# Patient Record
Sex: MALE | Race: WHITE | NOT HISPANIC OR LATINO | Employment: OTHER | URBAN - METROPOLITAN AREA
[De-identification: names, ages, dates, MRNs, and addresses within clinical notes are randomized per-mention and may not be internally consistent; named-entity substitution may affect disease eponyms.]

---

## 2017-02-06 ENCOUNTER — HISTORICAL (OUTPATIENT)
Dept: ADMINISTRATIVE | Facility: HOSPITAL | Age: 62
End: 2017-02-06

## 2017-02-06 LAB
ALBUMIN SERPL-MCNC: 3.3 G/DL (ref 3.1–4.7)
ALP SERPL-CCNC: 82 IU/L (ref 40–104)
ALT (SGPT): 12 IU/L (ref 3–33)
AST SERPL-CCNC: 14 IU/L (ref 10–40)
BASOPHILS NFR BLD: 0.1 K/UL (ref 0–0.2)
BASOPHILS NFR BLD: 0.6 %
BILIRUB SERPL-MCNC: 0.8 MG/DL (ref 0.3–1)
BILIRUBIN DIRECT+TOT PNL SERPL-MCNC: 0.1 MG/DL (ref 0–0.2)
BUN SERPL-MCNC: 37 MG/DL (ref 8–20)
CREATININE: 1.97 MG/DL (ref 0.6–1.4)
EOSINOPHIL NFR BLD: 0.4 K/UL (ref 0–0.7)
EOSINOPHIL NFR BLD: 3.8 %
ERYTHROCYTE [DISTWIDTH] IN BLOOD BY AUTOMATED COUNT: 15.5 % (ref 11.7–14.9)
GRAN #: 8.4 K/UL (ref 1.4–6.5)
GRAN%: 78.3 %
HCT VFR BLD AUTO: 33.8 % (ref 39–55)
HGB BLD-MCNC: 11 G/DL (ref 14–16)
IMMATURE GRANS (ABS): 0 K/UL (ref 0–1)
IMMATURE GRANULOCYTES: 0.3 %
LYMPH #: 1 K/UL (ref 1.2–3.4)
LYMPH%: 9.4 %
MCH RBC QN AUTO: 27.3 PG (ref 25–35)
MCHC RBC AUTO-ENTMCNC: 32.5 G/DL (ref 31–36)
MCV RBC AUTO: 83.9 FL (ref 80–100)
MONO #: 0.8 K/UL (ref 0.1–0.6)
MONO%: 7.6 %
NUCLEATED RBCS: 0 %
PLATELET # BLD AUTO: 262 K/UL (ref 140–440)
PMV BLD AUTO: 10 FL (ref 8.8–12.7)
PROT SERPL-MCNC: 7 G/DL (ref 6–8.2)
RBC # BLD AUTO: 4.03 M/UL (ref 4.3–5.9)
WBC # BLD AUTO: 10.8 K/UL (ref 5–10)

## 2017-02-08 LAB
ALBUMIN SERPL-MCNC: 3.3 G/DL (ref 3.1–4.7)
ALP SERPL-CCNC: 83 IU/L (ref 40–104)
ALT (SGPT): 12 IU/L (ref 3–33)
APTT PPP: 32.4 SEC (ref 21.7–37.8)
AST SERPL-CCNC: 13 IU/L (ref 10–40)
BILIRUB SERPL-MCNC: 0.8 MG/DL (ref 0.3–1)
BUN SERPL-MCNC: 35 MG/DL (ref 8–20)
CALCIUM SERPL-MCNC: 9.4 MG/DL (ref 7.7–10.4)
CHLORIDE: 92 MMOL/L (ref 98–110)
CO2 SERPL-SCNC: 28.4 MMOL/L (ref 22.8–31.6)
CREATININE: 2.05 MG/DL (ref 0.6–1.4)
GLUCOSE: 238 MG/DL (ref 70–99)
HBA1C MFR BLD: 9.4 % (ref 3.1–6.5)
HCT VFR BLD AUTO: 32.5 % (ref 39–55)
HGB BLD-MCNC: 10.4 G/DL (ref 14–16)
INR PPP: 1
MCH RBC QN AUTO: 27.3 PG (ref 25–35)
MCHC RBC AUTO-ENTMCNC: 32 G/DL (ref 31–36)
MCV RBC AUTO: 85.3 FL (ref 80–100)
NUCLEATED RBCS: 0 %
PLATELET # BLD AUTO: 305 K/UL (ref 140–440)
POTASSIUM SERPL-SCNC: 4.2 MMOL/L (ref 3.5–5)
PROT SERPL-MCNC: 7.2 G/DL (ref 6–8.2)
PROTHROMBIN TIME: 13.5 SEC (ref 11.3–15.2)
RBC # BLD AUTO: 3.81 M/UL (ref 4.3–5.9)
RBC # BLD AUTO: NORMAL /HPF
SODIUM: 132 MMOL/L (ref 134–144)
WBC # BLD AUTO: 12.6 K/UL (ref 5–10)
WBC # BLD: NORMAL /HPF

## 2017-02-10 LAB
BNP SERPL-MCNC: 1054 PG/ML (ref 0–100)
BUN SERPL-MCNC: 44 MG/DL (ref 8–20)
CALCIUM SERPL-MCNC: 9.1 MG/DL (ref 7.7–10.4)
CHLORIDE: 98 MMOL/L (ref 98–110)
CO2 SERPL-SCNC: 27 MMOL/L (ref 22.8–31.6)
CREATININE: 2.04 MG/DL (ref 0.6–1.4)
GLUCOSE: 175 MG/DL (ref 70–99)
POTASSIUM SERPL-SCNC: 4.3 MMOL/L (ref 3.5–5)
SODIUM: 135 MMOL/L (ref 134–144)

## 2017-04-24 ENCOUNTER — LAB VISIT (OUTPATIENT)
Dept: LAB | Facility: HOSPITAL | Age: 62
End: 2017-04-24
Attending: INTERNAL MEDICINE
Payer: COMMERCIAL

## 2017-04-24 ENCOUNTER — OFFICE VISIT (OUTPATIENT)
Dept: ENDOCRINOLOGY | Facility: CLINIC | Age: 62
End: 2017-04-24
Payer: COMMERCIAL

## 2017-04-24 VITALS
WEIGHT: 262.13 LBS | HEART RATE: 84 BPM | BODY MASS INDEX: 38.82 KG/M2 | HEIGHT: 69 IN | SYSTOLIC BLOOD PRESSURE: 138 MMHG | DIASTOLIC BLOOD PRESSURE: 78 MMHG

## 2017-04-24 DIAGNOSIS — E78.5 HYPERLIPIDEMIA, UNSPECIFIED HYPERLIPIDEMIA TYPE: ICD-10-CM

## 2017-04-24 DIAGNOSIS — E11.610 DIABETIC CHARCOT FOOT: ICD-10-CM

## 2017-04-24 DIAGNOSIS — Z79.4 TYPE 2 DIABETES MELLITUS WITH DIABETIC NEUROPATHIC ARTHROPATHY, WITH LONG-TERM CURRENT USE OF INSULIN: ICD-10-CM

## 2017-04-24 DIAGNOSIS — E11.319 DIABETIC RETINOPATHY ASSOCIATED WITH TYPE 2 DIABETES MELLITUS, MACULAR EDEMA PRESENCE UNSPECIFIED, UNSPECIFIED LATERALITY, UNSPECIFIED RETINOPATHY SEVERITY: ICD-10-CM

## 2017-04-24 DIAGNOSIS — E11.9 DM TYPE 2, NOT AT GOAL: ICD-10-CM

## 2017-04-24 DIAGNOSIS — I10 ESSENTIAL HYPERTENSION: ICD-10-CM

## 2017-04-24 DIAGNOSIS — Z79.4 TYPE 2 DIABETES MELLITUS WITH DIABETIC NEUROPATHIC ARTHROPATHY, WITH LONG-TERM CURRENT USE OF INSULIN: Primary | ICD-10-CM

## 2017-04-24 DIAGNOSIS — E11.610 TYPE 2 DIABETES MELLITUS WITH DIABETIC NEUROPATHIC ARTHROPATHY, WITH LONG-TERM CURRENT USE OF INSULIN: ICD-10-CM

## 2017-04-24 DIAGNOSIS — E11.610 TYPE 2 DIABETES MELLITUS WITH DIABETIC NEUROPATHIC ARTHROPATHY, WITH LONG-TERM CURRENT USE OF INSULIN: Primary | ICD-10-CM

## 2017-04-24 PROBLEM — E11.618 TYPE 2 DIABETES MELLITUS WITH DIABETIC ARTHROPATHY, WITH LONG-TERM CURRENT USE OF INSULIN: Status: ACTIVE | Noted: 2017-04-24

## 2017-04-24 LAB
ALBUMIN SERPL BCP-MCNC: 3.2 G/DL
ALP SERPL-CCNC: 147 U/L
ALT SERPL W/O P-5'-P-CCNC: 11 U/L
ANION GAP SERPL CALC-SCNC: 9 MMOL/L
AST SERPL-CCNC: 12 U/L
BILIRUB SERPL-MCNC: 0.4 MG/DL
BUN SERPL-MCNC: 33 MG/DL
CALCIUM SERPL-MCNC: 9.2 MG/DL
CHLORIDE SERPL-SCNC: 106 MMOL/L
CO2 SERPL-SCNC: 24 MMOL/L
CREAT SERPL-MCNC: 1.9 MG/DL
EST. GFR  (AFRICAN AMERICAN): 42.4 ML/MIN/1.73 M^2
EST. GFR  (NON AFRICAN AMERICAN): 36.7 ML/MIN/1.73 M^2
GLUCOSE SERPL-MCNC: 154 MG/DL
POTASSIUM SERPL-SCNC: 4.8 MMOL/L
PROT SERPL-MCNC: 6.7 G/DL
SODIUM SERPL-SCNC: 139 MMOL/L

## 2017-04-24 PROCEDURE — 83036 HEMOGLOBIN GLYCOSYLATED A1C: CPT

## 2017-04-24 PROCEDURE — 82985 ASSAY OF GLYCATED PROTEIN: CPT

## 2017-04-24 PROCEDURE — 1160F RVW MEDS BY RX/DR IN RCRD: CPT | Mod: S$GLB,,, | Performed by: INTERNAL MEDICINE

## 2017-04-24 PROCEDURE — 3078F DIAST BP <80 MM HG: CPT | Mod: S$GLB,,, | Performed by: INTERNAL MEDICINE

## 2017-04-24 PROCEDURE — 99205 OFFICE O/P NEW HI 60 MIN: CPT | Mod: S$GLB,,, | Performed by: INTERNAL MEDICINE

## 2017-04-24 PROCEDURE — 3075F SYST BP GE 130 - 139MM HG: CPT | Mod: S$GLB,,, | Performed by: INTERNAL MEDICINE

## 2017-04-24 PROCEDURE — 99999 PR PBB SHADOW E&M-EST. PATIENT-LVL II: CPT | Mod: PBBFAC,,, | Performed by: INTERNAL MEDICINE

## 2017-04-24 PROCEDURE — 36415 COLL VENOUS BLD VENIPUNCTURE: CPT | Mod: PO

## 2017-04-24 PROCEDURE — 80053 COMPREHEN METABOLIC PANEL: CPT

## 2017-04-24 RX ORDER — LANCETS
EACH MISCELLANEOUS
Qty: 200 EACH | Refills: 11
Start: 2017-04-24 | End: 2017-05-01 | Stop reason: SDUPTHER

## 2017-04-24 RX ORDER — FUROSEMIDE 20 MG/1
TABLET ORAL
COMMUNITY
Start: 2017-04-20 | End: 2019-01-11 | Stop reason: DRUGHIGH

## 2017-04-24 RX ORDER — CARVEDILOL 3.12 MG/1
TABLET ORAL
Refills: 2 | COMMUNITY
Start: 2017-03-24

## 2017-04-24 RX ORDER — HYDRALAZINE HYDROCHLORIDE 25 MG/1
TABLET, FILM COATED ORAL
COMMUNITY
Start: 2017-03-31

## 2017-04-24 RX ORDER — COLLAGENASE SANTYL 250 [ARB'U]/G
OINTMENT TOPICAL
COMMUNITY
Start: 2017-01-27

## 2017-04-24 NOTE — MR AVS SNAPSHOT
[facility] Endocrin[facility]  [address]  [place] [address]  Phone: [phone]  Fax: [phone]                  Hector Luis   2017 9:30 AM   Office Visit    Description:  Male : 1955   Provider:  Ashish Baxter MD   Department:  [facility]inology           Diagnoses this Visit        Comments    Type 2 diabetes mellitus with diabetic neuropathic arthropathy, with long-term current use of insulin    -  Primary     DM type 2, not at goal         Hyperlipidemia, unspecified hyperlipidemia type         Essential hypertension         Diabetic Charcot foot                To Do List           Future Appointments        Provider Department Dept Phone    2017 11:45 AM LAB, [place] [facility] [phone]    5/10/2017 10:15 AM LABSHARRI [facility] - Lab [phone]    2017 4:20 PM Geoffrey Astorga MD [facility] [phone]    2017 3:00 PM Ashish Baxter MD [facility] [phone]      Goals (5 Years of Data)     None      [facility] On Call     [facility] On Call Nurse Care Line -  Assistance  Unless otherwise directed by your provider, please contact [facility], our nurse care line that is available for  assistance.     Registered nurses in the [facility] provide: appointment scheduling, clinical advisement, health education, and other advisory services.  Call: [phone] (toll free)               Medications           Message regarding Medications     Verify the changes and/or additions to your medication regime listed below are the same as discussed with your clinician today.  If any of these changes or additions are incorrect, please notify your healthcare provider.        STOP taking these medications     amiodarone (PACERONE) 200 MG Tab     aspirin 325 MG tablet     cinnamon bark (CINNAMON) 500 mg capsule Take 500 mg by mouth once daily.    gabapentin (NEURONTIN) 100 MG capsule   "   hydrochlorothiazide (MICROZIDE) 12.5 mg capsule Take 12.5 mg by mouth once daily.    hydrocodone-acetaminophen 7.5-325mg (NORCO) 7.5-325 mg per tablet     isosorbide mononitrate (IMDUR) 30 MG 24 hr tablet     metoprolol succinate (TOPROL-XL) 25 MG 24 hr tablet     metoprolol tartrate (LOPRESSOR) 25 MG tablet     nystatin (MYCOSTATIN) 100,000 unit/mL suspension     potassium chloride SA (K-DUR,KLOR-CON) 20 MEQ tablet     WELCHOL 625 mg tablet            Verify that the below list of medications is an accurate representation of the medications you are currently taking.  If none reported, the list may be blank. If incorrect, please contact your healthcare provider. Carry this list with you in case of emergency.           Current Medications     aspirin (ECOTRIN) 81 MG EC tablet Take 81 mg by mouth once daily.      carvedilol (COREG) 3.125 MG tablet Pt is taking 1/2 tab BID    folic acid (FOLVITE) 1 MG tablet Take 1 tablet (1,000 mcg total) by mouth once daily.    furosemide (LASIX) 20 MG tablet     hydrALAZINE (APRESOLINE) 25 MG tablet     insulin aspart (NOVOLOG FLEXPEN) 100 unit/mL InPn pen INJECT SUBCUTANEOUSLY PER SLIDING SCALE 4 UNITS AT BREAKFAST, 10 UNITS AT LUNCH, AND 10 UNITS AT DINNER    insulin glargine (LANTUS SOLOSTAR) 100 unit/mL (3 mL) InPn INJECT 65 UNITS UNDER THE SKIN EVERY MORNING AND 55 UNITS UNDER THE SKIN EVERY EVENING    NITROSTAT 0.4 mg SL tablet     NOVOFINE 30 30 x 1/3 " Ndle USE FOUR TIMES DAILY    omeprazole (PRILOSEC) 40 MG capsule TAKE 1 CAPSULE BY MOUTH EVERY DAY    SANTYL ointment     albuterol 90 mcg/actuation HFAA Inhale 2 puffs into the lungs every 6 (six) hours as needed.           Clinical Reference Information           Your Vitals Were     BP Pulse Height Weight BMI    138/78 (BP Method: Manual) 84 5' 9" (1.753 m) 118.9 kg (262 lb 2 oz) 38.71 kg/m2      Blood Pressure          Most Recent Value    BP  138/78      Allergies as of 4/24/2017     Codeine      Immunizations " Administered on Date of Encounter - 4/24/2017     None      Orders Placed During Today's Visit     Future Labs/Procedures Expected by Expires    Comprehensive metabolic panel  4/24/2017 4/24/2018    FRUCTOSAMINE  4/24/2017 6/23/2018    FRUCTOSAMINE  4/24/2017 6/23/2018    Hemoglobin A1c  4/24/2017 4/24/2018      [facility] Sign-Up     Activating your [facility] account is as easy as 1-2-3!     1) Visit [email or web address], select Sign Up Now, enter this activation code and your date of birth, then select Next.  [email or web address]  Expires: 6/8/2017 10:41 AM      2) Create a username and password to use when you visit [facility] in the future and select a security question in case you lose your password and select Next.    3) Enter your e-mail address and click Sign Up!    Additional Information  If you have questions, please e-mail [email or web address] or call [phone] to talk to our [facility] staff. Remember, [facility] is NOT to be used for urgent needs. For medical emergencies, dial 911.         Language Assistance Services     ATTENTION: Language assistance services are available, free of charge. Please call [phone].      ATENCIÓN: Si habla español, tiene a scott disposición servicios gratuitos de asistencia lingüística. Llame al [phone].     CHÚ Ý: N?u b?n nói Ti?ng Vi?t, có các d?ch v? h? tr? ngôn ng? mi?n phí dành cho b?n. G?i s? [phone].         [facility] complies with applicable Federal civil rights laws and does not discriminate on the basis of race, color, national origin, age, disability, or sex.

## 2017-04-24 NOTE — PROGRESS NOTES
Subjective:      Patient ID: Hector Luis is a 61 y.o. male.    Chief Complaint:  No chief complaint on file.      History of Present Illness    Mr.Henry Luis is seen as a new patient     For diabetes     He was diagnosed >20 years ago    Meals :  BF : fried or scrambled eggs   Lunch: big lunch with carbs   Dinner : lighter dinner  Snacks bedtime snack : fruit cup     Exercise can not due to charcot foot ; under care of podiatrist and ID    Complications :  CHD - has a nephrologist       Back steroid injection in 8/2016 - had caused hyperglycemia     Checks one or twice a day     lantus 60 units  in AM and 50 units  PM   If BG <150 then he will not take morning lantus     novolog takes as sliding scale N-100 divided by 10     ophthal : ~ 8 months ago ,      Review of Systems   Constitutional: Negative for unexpected weight change.   Cardiovascular: Positive for leg swelling (right >left ).   Gastrointestinal: Negative for constipation and diarrhea.   Endocrine: Positive for polydipsia and polyuria.        No nocturia      Musculoskeletal: Positive for arthralgias.   Skin: Positive for wound.   Neurological: Positive for weakness (gait unsteady ).   Psychiatric/Behavioral: Positive for sleep disturbance.       Objective:   Physical Exam   Constitutional: He appears well-developed.   HENT:   Right Ear: External ear normal.   Left Ear: External ear normal.   Nose: Nose normal.   Hearing normal    Neck: No tracheal deviation present. No thyromegaly present.   Cardiovascular: Normal rate.    No murmur heard.  Pulmonary/Chest: Effort normal and breath sounds normal.   Abdominal: Soft. He exhibits no mass.   No hernia noted   Musculoskeletal: He exhibits no edema.        Right foot: There is no deformity.        Left foot: There is no deformity.   Feet:   Right Foot:   Skin Integrity: Negative for ulcer.   Left Foot:   Skin Integrity: Negative for ulcer.   Neurological: He is alert. No cranial nerve deficit or sensory  deficit. Coordination and gait normal.        Skin: No rash noted.     injection sites are +  lipo hypertropthy or atrophy    +acanthosis and skin tags  No supraclavicular fulness         Psychiatric: He has a normal mood and affect. Judgment normal.   Vitals reviewed.  gait unsteady     Lab Review:   Today     Assessment:     1. Type 2 diabetes mellitus with diabetic neuropathic arthropathy, with long-term current use of insulin  Hemoglobin A1c    FRUCTOSAMINE    Comprehensive metabolic panel    FRUCTOSAMINE   2. DM type 2, not at goal  Hemoglobin A1c    FRUCTOSAMINE    Comprehensive metabolic panel    FRUCTOSAMINE   3. Hyperlipidemia, unspecified hyperlipidemia type  Hemoglobin A1c    FRUCTOSAMINE    Comprehensive metabolic panel    FRUCTOSAMINE   4. Essential hypertension  Hemoglobin A1c    FRUCTOSAMINE    Comprehensive metabolic panel    FRUCTOSAMINE   5. Diabetic Charcot foot  Hemoglobin A1c    FRUCTOSAMINE    Comprehensive metabolic panel    FRUCTOSAMINE   6. Diabetic retinopathy associated with type 2 diabetes mellitus, macular edema presence unspecified, unspecified laterality, unspecified retinopathy severity          # type 2 diabetes mellitus with CKD, Charcot foot, neuropathy,  CAD, DR with laser treatment     - change ;antus to 50 units in the morning   - change to novolog 8-10-8 plus sliding scale starting at 180   - check before meals and bedtime   - discussed disease progression, complication and insulin physiology and made changes    - advised to avoid lipodystrophy sites   - also send me log or call me if BG > 250 or <70       # PN with diabetes     - used to take gabapentin but not too bad per him     # charcot foot - follows with a podiatrist and ID       # HTN at goal     # dyslipidemia - controlled per him   Follows with a cardiologist     Spent >60 minutes during this encounter and more than  Half of that time spent on counseling     Plan:     Follow up:    HBA1c, CMP  today  Fructosamine  today and before next visit   F/u with me 3 weeks

## 2017-04-25 LAB
ESTIMATED AVG GLUCOSE: 226 MG/DL
HBA1C MFR BLD HPLC: 9.5 %

## 2017-04-28 LAB — FRUCTOSAMINE SERPL-SCNC: 304 UMOL /L (ref 0–285)

## 2017-05-01 ENCOUNTER — PATIENT MESSAGE (OUTPATIENT)
Dept: ENDOCRINOLOGY | Facility: CLINIC | Age: 62
End: 2017-05-01

## 2017-05-01 RX ORDER — LANCETS
EACH MISCELLANEOUS
Qty: 200 EACH | Refills: 11
Start: 2017-05-01

## 2017-05-09 ENCOUNTER — PATIENT MESSAGE (OUTPATIENT)
Dept: ENDOCRINOLOGY | Facility: CLINIC | Age: 62
End: 2017-05-09

## 2017-05-10 RX ORDER — INSULIN PUMP SYRINGE, 3 ML
EACH MISCELLANEOUS
Qty: 1 EACH | Refills: 0 | Status: SHIPPED | OUTPATIENT
Start: 2017-05-10 | End: 2019-01-17

## 2017-05-10 RX ORDER — BLOOD-GLUCOSE CONTROL, NORMAL
1 EACH MISCELLANEOUS 4 TIMES DAILY
Qty: 400 EACH | Refills: 3 | Status: SHIPPED | OUTPATIENT
Start: 2017-05-10 | End: 2018-05-10

## 2017-05-17 ENCOUNTER — LAB VISIT (OUTPATIENT)
Dept: LAB | Facility: HOSPITAL | Age: 62
End: 2017-05-17
Attending: INTERNAL MEDICINE
Payer: COMMERCIAL

## 2017-05-17 ENCOUNTER — OFFICE VISIT (OUTPATIENT)
Dept: ENDOCRINOLOGY | Facility: CLINIC | Age: 62
End: 2017-05-17
Payer: COMMERCIAL

## 2017-05-17 VITALS
DIASTOLIC BLOOD PRESSURE: 72 MMHG | SYSTOLIC BLOOD PRESSURE: 142 MMHG | WEIGHT: 263.75 LBS | HEIGHT: 69 IN | HEART RATE: 78 BPM | BODY MASS INDEX: 39.06 KG/M2

## 2017-05-17 DIAGNOSIS — Z79.4 TYPE 2 DIABETES MELLITUS WITH DIABETIC NEUROPATHIC ARTHROPATHY, WITH LONG-TERM CURRENT USE OF INSULIN: Primary | ICD-10-CM

## 2017-05-17 DIAGNOSIS — E11.610 TYPE 2 DIABETES MELLITUS WITH DIABETIC NEUROPATHIC ARTHROPATHY, WITH LONG-TERM CURRENT USE OF INSULIN: Primary | ICD-10-CM

## 2017-05-17 DIAGNOSIS — Z79.4 TYPE 2 DIABETES MELLITUS WITH DIABETIC NEUROPATHIC ARTHROPATHY, WITH LONG-TERM CURRENT USE OF INSULIN: ICD-10-CM

## 2017-05-17 DIAGNOSIS — Z79.4 TYPE 2 DIABETES MELLITUS WITH STAGE 3 CHRONIC KIDNEY DISEASE, WITH LONG-TERM CURRENT USE OF INSULIN: ICD-10-CM

## 2017-05-17 DIAGNOSIS — N18.30 TYPE 2 DIABETES MELLITUS WITH STAGE 3 CHRONIC KIDNEY DISEASE, WITH LONG-TERM CURRENT USE OF INSULIN: ICD-10-CM

## 2017-05-17 DIAGNOSIS — E11.22 TYPE 2 DIABETES MELLITUS WITH STAGE 3 CHRONIC KIDNEY DISEASE, WITH LONG-TERM CURRENT USE OF INSULIN: ICD-10-CM

## 2017-05-17 DIAGNOSIS — E11.610 TYPE 2 DIABETES MELLITUS WITH DIABETIC NEUROPATHIC ARTHROPATHY, WITH LONG-TERM CURRENT USE OF INSULIN: ICD-10-CM

## 2017-05-17 PROCEDURE — 1160F RVW MEDS BY RX/DR IN RCRD: CPT | Mod: S$GLB,,, | Performed by: INTERNAL MEDICINE

## 2017-05-17 PROCEDURE — 82985 ASSAY OF GLYCATED PROTEIN: CPT

## 2017-05-17 PROCEDURE — 99999 PR PBB SHADOW E&M-EST. PATIENT-LVL III: CPT | Mod: PBBFAC,,, | Performed by: INTERNAL MEDICINE

## 2017-05-17 PROCEDURE — 3046F HEMOGLOBIN A1C LEVEL >9.0%: CPT | Mod: S$GLB,,, | Performed by: INTERNAL MEDICINE

## 2017-05-17 PROCEDURE — 3078F DIAST BP <80 MM HG: CPT | Mod: S$GLB,,, | Performed by: INTERNAL MEDICINE

## 2017-05-17 PROCEDURE — 99214 OFFICE O/P EST MOD 30 MIN: CPT | Mod: S$GLB,,, | Performed by: INTERNAL MEDICINE

## 2017-05-17 PROCEDURE — 3060F POS MICROALBUMINURIA REV: CPT | Mod: 8P,S$GLB,, | Performed by: INTERNAL MEDICINE

## 2017-05-17 PROCEDURE — 3077F SYST BP >= 140 MM HG: CPT | Mod: S$GLB,,, | Performed by: INTERNAL MEDICINE

## 2017-05-17 PROCEDURE — 36415 COLL VENOUS BLD VENIPUNCTURE: CPT | Mod: PO

## 2017-05-17 NOTE — PROGRESS NOTES
"Subjective:      Patient ID: Hector Alcantara is a 61 y.o. male.    Chief Complaint:  Diabetes Mellitus      History of Present Illness    Mr.Henry Alcantara is seen as a new patient     For diabetes     He was diagnosed >20 years ago    Meals :  BF : fried or scrambled eggs   Lunch: big lunch with carbs   Dinner : lighter dinner  Snacks bedtime snack : fruit cup     Exercise can not due to charcot foot ; under care of podiatrist and ID    Complications :  CHD - has a nephrologist       Back steroid injection in 8/2016 - had caused hyperglycemia     Checks one or twice a day     lantus 50 units daily  novolog 18-8-10 plus sliding scale      ophthal : ~ 8 months ago ,      Review of Systems   Constitutional: Negative for unexpected weight change.   Cardiovascular: Positive for leg swelling (right >left ).   Gastrointestinal: Negative for constipation and diarrhea.   Endocrine: Positive for polydipsia and polyuria.        No nocturia      Musculoskeletal: Positive for arthralgias.   Skin: Positive for wound.   Neurological: Positive for weakness (gait unsteady ).   Psychiatric/Behavioral: Positive for sleep disturbance.       Objective:   Physical Exam    Vitals:    05/17/17 1546   BP: (!) 142/72   BP Location: Right arm   Patient Position: Sitting   BP Method: Manual   Pulse: 78   Weight: 119.7 kg (263 lb 12.5 oz)   Height: 5' 9" (1.753 m)       Lab Review:   Results for HECTOR ALCANTARA (MRN [id number]) as of 5/17/2017 18:14   Ref. Range 4/24/2017 11:01   Sodium Latest Ref Range: 136 - 145 mmol/L 139   Potassium Latest Ref Range: 3.5 - 5.1 mmol/L 4.8   Chloride Latest Ref Range: 95 - 110 mmol/L 106   CO2 Latest Ref Range: 23 - 29 mmol/L 24   Anion Gap Latest Ref Range: 8 - 16 mmol/L 9   BUN, Bld Latest Ref Range: 8 - 23 mg/dL 33 (H)   Creatinine Latest Ref Range: 0.5 - 1.4 mg/dL 1.9 (H)   eGFR if non African American Latest Ref Range: >60 mL/min/1.73 m^2 36.7 (A)   eGFR if African American Latest Ref Range: >60 mL/min/1.73 m^2 " 42.4 (A)   Glucose Latest Ref Range: 70 - 110 mg/dL 154 (H)   Calcium Latest Ref Range: 8.7 - 10.5 mg/dL 9.2   Alkaline Phosphatase Latest Ref Range: 55 - 135 U/L 147 (H)   Total Protein Latest Ref Range: 6.0 - 8.4 g/dL 6.7   Albumin Latest Ref Range: 3.5 - 5.2 g/dL 3.2 (L)   Total Bilirubin Latest Ref Range: 0.1 - 1.0 mg/dL 0.4   AST Latest Ref Range: 10 - 40 U/L 12   ALT Latest Ref Range: 10 - 44 U/L 11   Hemoglobin A1C Latest Ref Range: 4.5 - 6.2 % 9.5 (H)   Estimated Avg Glucose Latest Ref Range: 68 - 131 mg/dL 226 (H)   Fructosamine Latest Ref Range: 0 - 285 umol /L 304 (H)        Assessment:     1. Type 2 diabetes mellitus with diabetic neuropathic arthropathy, with long-term current use of insulin  FRUCTOSAMINE   2. Type 2 diabetes mellitus with stage 3 chronic kidney disease, with long-term current use of insulin  FRUCTOSAMINE        # type 2 diabetes mellitus with CKD, Charcot foot, neuropathy,  CAD, DR with laser treatment     - continue lantus to 50 units in the morning   - change to novolog 10-10-10 plus sliding scale starting at 180   - advise not skip     # PN with diabetes     - used to take gabapentin but not too bad per him     # charcot foot - follows with a podiatrist and ID       # HTN at goal     # Dyslipidemia - controlled per him   Follows with a cardiologist         Plan:     Follow up:  Fructosamine today    F/u with me 3 months

## 2017-05-17 NOTE — MR AVS SNAPSHOT
[facility] Endocrinology  [address]  [place] [address]  Phone: [phone]  Fax: [phone]                  Hector Luis   2017 3:00 PM   Office Visit    Description:  Male : 1955   Provider:  Ashish Baxter MD   Department:  [facility] - Endocrinology           Reason for Visit     Diabetes Mellitus           Diagnoses this Visit        Comments    Type 2 diabetes mellitus with diabetic neuropathic arthropathy, with long-term current use of insulin    -  Primary     Type 2 diabetes mellitus with stage 3 chronic kidney disease, with long-term current use of insulin                To Do List           Future Appointments        Provider Department Dept Phone    2017 1:20 PM Geoffrey Astorga MD [facility] [phone]    2017 8:00 AM SHARRI ANDRADE [facility] - Lab [phone]    2017 10:00 AM Ashish Baxter MD [facility] [phone]      Goals (5 Years of Data)     None      [facility] On Call     [facility] Nurse Care Line -  Assistance  Unless otherwise directed by your provider, please contact [facility], our nurse care line that is available for  assistance.     Registered nurses in the [facility] provide: appointment scheduling, clinical advisement, health education, and other advisory services.  Call: [phone] (toll free)               Medications           Message regarding Medications     Verify the changes and/or additions to your medication regime listed below are the same as discussed with your clinician today.  If any of these changes or additions are incorrect, please notify your healthcare provider.        STOP taking these medications     albuterol 90 mcg/actuation HFAA Inhale 2 puffs into the lungs every 6 (six) hours as needed.           Verify that the below list of medications is an accurate representation of the medications you are currently taking.  If none reported, the list  "may be blank. If incorrect, please contact your healthcare provider. Carry this list with you in case of emergency.           Current Medications     aspirin (ECOTRIN) 81 MG EC tablet Take 81 mg by mouth once daily.      blood sugar diagnostic (TRUETEST TEST STRIPS) Strp 1 strip by Misc.(Non-Drug; Combo Route) route 4 (four) times daily.    blood-glucose meter (TRUE METRIX AIR GLUCOSE METER) kit Use as instructed    carvedilol (COREG) 3.125 MG tablet Pt is taking 1/2 tab BID    folic acid (FOLVITE) 1 MG tablet Take 1 tablet (1,000 mcg total) by mouth once daily.    furosemide (LASIX) 20 MG tablet     hydrALAZINE (APRESOLINE) 25 MG tablet     insulin aspart (NOVOLOG FLEXPEN) 100 unit/mL InPn pen INJECT SUBCUTANEOUSLY PER SLIDING SCALE 4 UNITS AT BREAKFAST, 10 UNITS AT LUNCH, AND 10 UNITS AT DINNER    insulin glargine (LANTUS SOLOSTAR) 100 unit/mL (3 mL) InPn INJECT 65 UNITS UNDER THE SKIN EVERY MORNING AND 55 UNITS UNDER THE SKIN EVERY EVENING    lancets (TRUEPLUS LANCETS) 30 gauge Misc 1 lancet by Misc.(Non-Drug; Combo Route) route 4 (four) times daily. Used to test blood glucose 4 times daily    lancets Misc Checks 4 times a day    NITROSTAT 0.4 mg SL tablet     omeprazole (PRILOSEC) 40 MG capsule TAKE 1 CAPSULE BY MOUTH EVERY DAY    pen needle, diabetic (NOVOFINE 30) 30 gauge x 1/3" Ndle USE FOUR TIMES DAILY    SANTYL ointment            Clinical Reference Information           Your Vitals Were     BP Pulse Height Weight BMI    142/72 (BP Location: Right arm, Patient Position: Sitting, BP Method: Manual) 78 5' 9" (1.753 m) 119.7 kg (263 lb 12.5 oz) 38.95 kg/m2      Blood Pressure          Most Recent Value    BP  (!)  142/72      Allergies as of 5/17/2017     Codeine      Immunizations Administered on Date of Encounter - 5/17/2017     None      Orders Placed During Today's Visit     Future Labs/Procedures Expected by Expires    FRUCTOSAMINE  5/17/2017 7/16/2018      Instructions    # type 2 diabetes mellitus with " CKD, Charcot foot, neuropathy,  CAD, DR with laser treatment     - change lantus to 50 units in the morning   - change to novolog 10-10-10 plus sliding scale starting at 180        Language Assistance Services     ATTENTION: Language assistance services are available, free of charge. Please call [phone].      ATENCIÓN: Si habla roberto, tiene a scott disposición servicios gratuitos de asistencia lingüística. Llame al [phone].     CHÚ Ý: N?u b?n nói Ti?ng Vi?t, có các d?ch v? h? tr? ngôn ng? mi?n phí dành cho b?n. G?i s? [phone].         [facility] complies with applicable Federal civil rights laws and does not discriminate on the basis of race, color, national origin, age, disability, or sex.

## 2017-05-19 ENCOUNTER — TELEPHONE (OUTPATIENT)
Dept: ENDOCRINOLOGY | Facility: CLINIC | Age: 62
End: 2017-05-19

## 2017-05-19 DIAGNOSIS — E11.65 TYPE 2 DIABETES MELLITUS WITH HYPERGLYCEMIA, WITH LONG-TERM CURRENT USE OF INSULIN: Primary | ICD-10-CM

## 2017-05-19 DIAGNOSIS — Z79.4 TYPE 2 DIABETES MELLITUS WITH HYPERGLYCEMIA, WITH LONG-TERM CURRENT USE OF INSULIN: Primary | ICD-10-CM

## 2017-05-19 LAB — FRUCTOSAMINE SERPL-SCNC: 315 UMOL /L (ref 0–285)

## 2017-06-19 LAB
ALBUMIN SERPL-MCNC: 3.5 G/DL (ref 3.1–4.7)
ALP SERPL-CCNC: 120 IU/L (ref 40–104)
ALT (SGPT): 13 IU/L (ref 3–33)
AST SERPL-CCNC: 13 IU/L (ref 10–40)
BASOPHILS NFR BLD: 0.1 K/UL (ref 0–0.2)
BASOPHILS NFR BLD: 0.8 %
BILIRUB SERPL-MCNC: 0.5 MG/DL (ref 0.3–1)
BUN SERPL-MCNC: 31 MG/DL (ref 8–20)
CALCIUM SERPL-MCNC: 9.1 MG/DL (ref 7.7–10.4)
CHLORIDE: 104 MMOL/L (ref 98–110)
CO2 SERPL-SCNC: 23.5 MMOL/L (ref 22.8–31.6)
CREATININE: 1.66 MG/DL (ref 0.6–1.4)
EOSINOPHIL NFR BLD: 0.4 K/UL (ref 0–0.7)
EOSINOPHIL NFR BLD: 3.5 %
ERYTHROCYTE [DISTWIDTH] IN BLOOD BY AUTOMATED COUNT: 15.6 % (ref 11.7–14.9)
GLUCOSE: 295 MG/DL (ref 70–99)
GRAN #: 8.1 K/UL (ref 1.4–6.5)
GRAN%: 79.8 %
HBA1C MFR BLD: 10.1 % (ref 3.1–6.5)
HCT VFR BLD AUTO: 38.5 % (ref 39–55)
HGB BLD-MCNC: 12.4 G/DL (ref 14–16)
IMMATURE GRANS (ABS): 0.1 K/UL (ref 0–1)
IMMATURE GRANULOCYTES: 0.6 %
LYMPH #: 1 K/UL (ref 1.2–3.4)
LYMPH%: 9.8 %
MCH RBC QN AUTO: 27.1 PG (ref 25–35)
MCHC RBC AUTO-ENTMCNC: 32.2 G/DL (ref 31–36)
MCV RBC AUTO: 84.2 FL (ref 80–100)
MONO #: 0.6 K/UL (ref 0.1–0.6)
MONO%: 5.5 %
NUCLEATED RBCS: 0 %
PLATELET # BLD AUTO: 255 K/UL (ref 140–440)
PMV BLD AUTO: 9.5 FL (ref 8.8–12.7)
POTASSIUM SERPL-SCNC: 4 MMOL/L (ref 3.5–5)
PROT SERPL-MCNC: 6.7 G/DL (ref 6–8.2)
RBC # BLD AUTO: 4.57 M/UL (ref 4.3–5.9)
SODIUM: 137 MMOL/L (ref 134–144)
WBC # BLD AUTO: 10.2 K/UL (ref 5–10)

## 2017-06-28 ENCOUNTER — PATIENT MESSAGE (OUTPATIENT)
Dept: ENDOCRINOLOGY | Facility: CLINIC | Age: 62
End: 2017-06-28

## 2017-08-21 ENCOUNTER — LAB VISIT (OUTPATIENT)
Dept: LAB | Facility: HOSPITAL | Age: 62
End: 2017-08-21
Attending: INTERNAL MEDICINE
Payer: COMMERCIAL

## 2017-08-21 DIAGNOSIS — E11.65 TYPE 2 DIABETES MELLITUS WITH HYPERGLYCEMIA, WITH LONG-TERM CURRENT USE OF INSULIN: ICD-10-CM

## 2017-08-21 DIAGNOSIS — Z79.4 TYPE 2 DIABETES MELLITUS WITH HYPERGLYCEMIA, WITH LONG-TERM CURRENT USE OF INSULIN: ICD-10-CM

## 2017-08-21 LAB
ALBUMIN SERPL BCP-MCNC: 3.4 G/DL
ALP SERPL-CCNC: 108 U/L
ALT SERPL W/O P-5'-P-CCNC: 13 U/L
ANION GAP SERPL CALC-SCNC: 9 MMOL/L
AST SERPL-CCNC: 16 U/L
BILIRUB SERPL-MCNC: 0.4 MG/DL
BUN SERPL-MCNC: 37 MG/DL
CALCIUM SERPL-MCNC: 10 MG/DL
CHLORIDE SERPL-SCNC: 107 MMOL/L
CO2 SERPL-SCNC: 25 MMOL/L
CREAT SERPL-MCNC: 1.9 MG/DL
EST. GFR  (AFRICAN AMERICAN): 42.1 ML/MIN/1.73 M^2
EST. GFR  (NON AFRICAN AMERICAN): 36.4 ML/MIN/1.73 M^2
ESTIMATED AVG GLUCOSE: 209 MG/DL
GLUCOSE SERPL-MCNC: 130 MG/DL
HBA1C MFR BLD HPLC: 8.9 %
POTASSIUM SERPL-SCNC: 4.5 MMOL/L
PROT SERPL-MCNC: 7.1 G/DL
SODIUM SERPL-SCNC: 141 MMOL/L

## 2017-08-21 PROCEDURE — 82985 ASSAY OF GLYCATED PROTEIN: CPT

## 2017-08-21 PROCEDURE — 80053 COMPREHEN METABOLIC PANEL: CPT

## 2017-08-21 PROCEDURE — 36415 COLL VENOUS BLD VENIPUNCTURE: CPT | Mod: PO

## 2017-08-21 PROCEDURE — 83036 HEMOGLOBIN GLYCOSYLATED A1C: CPT

## 2017-08-25 LAB — FRUCTOSAMINE SERPL-SCNC: 291 UMOL /L (ref 0–285)

## 2017-08-28 ENCOUNTER — OFFICE VISIT (OUTPATIENT)
Dept: ENDOCRINOLOGY | Facility: CLINIC | Age: 62
End: 2017-08-28
Payer: COMMERCIAL

## 2017-08-28 VITALS
SYSTOLIC BLOOD PRESSURE: 118 MMHG | DIASTOLIC BLOOD PRESSURE: 80 MMHG | HEART RATE: 75 BPM | WEIGHT: 268 LBS | HEIGHT: 69 IN | BODY MASS INDEX: 39.69 KG/M2

## 2017-08-28 DIAGNOSIS — E11.319 DIABETIC RETINOPATHY ASSOCIATED WITH TYPE 2 DIABETES MELLITUS, MACULAR EDEMA PRESENCE UNSPECIFIED, UNSPECIFIED LATERALITY, UNSPECIFIED RETINOPATHY SEVERITY: Primary | ICD-10-CM

## 2017-08-28 DIAGNOSIS — Z79.4 TYPE 2 DIABETES MELLITUS WITH DIABETIC NEUROPATHIC ARTHROPATHY, WITH LONG-TERM CURRENT USE OF INSULIN: ICD-10-CM

## 2017-08-28 DIAGNOSIS — E11.610 DIABETIC CHARCOT FOOT: ICD-10-CM

## 2017-08-28 DIAGNOSIS — Z79.4 TYPE 2 DIABETES MELLITUS WITH STAGE 3 CHRONIC KIDNEY DISEASE, WITH LONG-TERM CURRENT USE OF INSULIN: ICD-10-CM

## 2017-08-28 DIAGNOSIS — E78.5 HYPERLIPIDEMIA, UNSPECIFIED HYPERLIPIDEMIA TYPE: ICD-10-CM

## 2017-08-28 DIAGNOSIS — N18.30 TYPE 2 DIABETES MELLITUS WITH STAGE 3 CHRONIC KIDNEY DISEASE, WITH LONG-TERM CURRENT USE OF INSULIN: ICD-10-CM

## 2017-08-28 DIAGNOSIS — E11.22 TYPE 2 DIABETES MELLITUS WITH STAGE 3 CHRONIC KIDNEY DISEASE, WITH LONG-TERM CURRENT USE OF INSULIN: ICD-10-CM

## 2017-08-28 DIAGNOSIS — E11.610 TYPE 2 DIABETES MELLITUS WITH DIABETIC NEUROPATHIC ARTHROPATHY, WITH LONG-TERM CURRENT USE OF INSULIN: ICD-10-CM

## 2017-08-28 DIAGNOSIS — I10 ESSENTIAL HYPERTENSION: ICD-10-CM

## 2017-08-28 PROCEDURE — 99214 OFFICE O/P EST MOD 30 MIN: CPT | Mod: S$GLB,,, | Performed by: INTERNAL MEDICINE

## 2017-08-28 PROCEDURE — 3045F PR MOST RECENT HEMOGLOBIN A1C LEVEL 7.0-9.0%: CPT | Mod: S$GLB,,, | Performed by: INTERNAL MEDICINE

## 2017-08-28 PROCEDURE — 3074F SYST BP LT 130 MM HG: CPT | Mod: S$GLB,,, | Performed by: INTERNAL MEDICINE

## 2017-08-28 PROCEDURE — 99999 PR PBB SHADOW E&M-EST. PATIENT-LVL III: CPT | Mod: PBBFAC,,, | Performed by: INTERNAL MEDICINE

## 2017-08-28 PROCEDURE — 3008F BODY MASS INDEX DOCD: CPT | Mod: S$GLB,,, | Performed by: INTERNAL MEDICINE

## 2017-08-28 PROCEDURE — 3079F DIAST BP 80-89 MM HG: CPT | Mod: S$GLB,,, | Performed by: INTERNAL MEDICINE

## 2017-08-28 NOTE — PROGRESS NOTES
Subjective:      Patient ID: Hector Luis is a 62 y.o. male.    Chief Complaint:  Diabetes Mellitus      History of Present Illness    Mr.Henry BECCA Luis is seen as f/u       For diabetes     He was diagnosed >20 years ago        Exercise can not due to charcot foot ; under care of podiatrist and ID    Complications :  CKD - has a nephrologist   Nephropathy   Retinopathy         Back steroid injection in 8/2016 - had caused hyperglycemia     Checks one or twice a day         ophthal : ~ 8 months ago , DR     Did not bring a log or meter          Review of Systems   Constitutional: Negative for unexpected weight change.   Gastrointestinal: Negative for constipation and diarrhea.   Endocrine: Positive for polydipsia and polyuria.        No nocturia      Musculoskeletal: Positive for arthralgias.   Skin: Positive for wound.   Neurological: Positive for weakness (gait unsteady ).   Psychiatric/Behavioral: Positive for sleep disturbance.        Sleeps in recliner and snores        Objective:   Physical Exam    There were no vitals filed for this visit.    Lab Review:   Results for orders placed or performed in visit on 08/21/17   Hemoglobin A1c   Result Value Ref Range    Hemoglobin A1C 8.9 (H) 4.0 - 5.6 %    Estimated Avg Glucose 209 (H) 68 - 131 mg/dL   FRUCTOSAMINE   Result Value Ref Range    Fructosamine 291 (H) 0 - 285 umol /L   Comprehensive metabolic panel   Result Value Ref Range    Sodium 141 136 - 145 mmol/L    Potassium 4.5 3.5 - 5.1 mmol/L    Chloride 107 95 - 110 mmol/L    CO2 25 23 - 29 mmol/L    Glucose 130 (H) 70 - 110 mg/dL    BUN, Bld 37 (H) 8 - 23 mg/dL    Creatinine 1.9 (H) 0.5 - 1.4 mg/dL    Calcium 10.0 8.7 - 10.5 mg/dL    Total Protein 7.1 6.0 - 8.4 g/dL    Albumin 3.4 (L) 3.5 - 5.2 g/dL    Total Bilirubin 0.4 0.1 - 1.0 mg/dL    Alkaline Phosphatase 108 55 - 135 U/L    AST 16 10 - 40 U/L    ALT 13 10 - 44 U/L    Anion Gap 9 8 - 16 mmol/L    eGFR if African American 42.1 (A) >60 mL/min/1.73 m^2     eGFR if non  36.4 (A) >60 mL/min/1.73 m^2       Results for ANASTASIA ALCANTARA (MRN [id number]) as of 5/17/2017 18:14   Ref. Range 4/24/2017 11:01   Sodium Latest Ref Range: 136 - 145 mmol/L 139   Potassium Latest Ref Range: 3.5 - 5.1 mmol/L 4.8   Chloride Latest Ref Range: 95 - 110 mmol/L 106   CO2 Latest Ref Range: 23 - 29 mmol/L 24   Anion Gap Latest Ref Range: 8 - 16 mmol/L 9   BUN, Bld Latest Ref Range: 8 - 23 mg/dL 33 (H)   Creatinine Latest Ref Range: 0.5 - 1.4 mg/dL 1.9 (H)   eGFR if non African American Latest Ref Range: >60 mL/min/1.73 m^2 36.7 (A)   eGFR if African American Latest Ref Range: >60 mL/min/1.73 m^2 42.4 (A)   Glucose Latest Ref Range: 70 - 110 mg/dL 154 (H)   Calcium Latest Ref Range: 8.7 - 10.5 mg/dL 9.2   Alkaline Phosphatase Latest Ref Range: 55 - 135 U/L 147 (H)   Total Protein Latest Ref Range: 6.0 - 8.4 g/dL 6.7   Albumin Latest Ref Range: 3.5 - 5.2 g/dL 3.2 (L)   Total Bilirubin Latest Ref Range: 0.1 - 1.0 mg/dL 0.4   AST Latest Ref Range: 10 - 40 U/L 12   ALT Latest Ref Range: 10 - 44 U/L 11   Hemoglobin A1C Latest Ref Range: 4.5 - 6.2 % 9.5 (H)   Estimated Avg Glucose Latest Ref Range: 68 - 131 mg/dL 226 (H)   Fructosamine Latest Ref Range: 0 - 285 umol /L 304 (H)        Assessment:     1. Diabetic retinopathy associated with type 2 diabetes mellitus, macular edema presence unspecified, unspecified laterality, unspecified retinopathy severity     2. Essential hypertension     3. Hyperlipidemia, unspecified hyperlipidemia type     4. Type 2 diabetes mellitus with diabetic neuropathic arthropathy, with long-term current use of insulin     5. Type 2 diabetes mellitus with stage 3 chronic kidney disease, with long-term current use of insulin     6. Diabetic Charcot foot          # type 2 diabetes mellitus with CKD, Charcot foot, neuropathy,  CAD, DR with laser treatment     - continue lantus to 25 units twice     - change to novolog [id number] with meals  plus sliding scale  starting at 180   - advised to keep BG log - send me in 2 weeks   - also stressed on checking BG and maintaining BG log and being every visit     # PN with diabetes     - used to take gabapentin but not too bad per him     # charcot foot - follows with a podiatrist and ID       # HTN at goal     # Dyslipidemia      Follows with a cardiologist     # recommend sleep apnea evaluation       Plan:     Follow up:  Send me blood sugar log    F/u with me 3 months with labs prior to visit

## 2017-09-15 ENCOUNTER — TELEPHONE (OUTPATIENT)
Dept: TRANSPLANT | Facility: CLINIC | Age: 62
End: 2017-09-15

## 2017-09-15 NOTE — TELEPHONE ENCOUNTER
Called to schedule consult appointment to [facility]. No answer. Left message including my contact information to please call back.

## 2017-09-19 NOTE — TELEPHONE ENCOUNTER
Spoke to patient. States not aware of this referral to [facility]. Appointment not scheduled at this time as patient would like to speak with his cardiologist first. I left a message with Dr. Mullen's office/ answering service to please call back to verify/ confirm need for referral.

## 2017-09-20 ENCOUNTER — TELEPHONE (OUTPATIENT)
Dept: TRANSPLANT | Facility: CLINIC | Age: 62
End: 2017-09-20

## 2017-09-20 NOTE — TELEPHONE ENCOUNTER
"Spoke with patient who says spoke with Dr. Mullen's "nurse" today who says referral request was in error. I left a message on Ginger's voicemail with Dr. Mullen's office for clarification. My contact information left on voicemail.  "

## 2017-09-20 NOTE — TELEPHONE ENCOUNTER
Spoke to patient. States left a message for Dr. Mullen's office about referral. Informed patient that I've also reached out to Dr. Mullen's office and waiting to hear back.

## 2017-09-25 NOTE — TELEPHONE ENCOUNTER
Spoke with Sofya (message service for Dr. Mullen). Left message to please call back and clarify if referral to [facility] needed. My contact information given.

## 2017-09-27 NOTE — TELEPHONE ENCOUNTER
Left message on voicemail along with my contact information for Melinda the  for [facility] to please call back and clarify if referral necessary.

## 2017-10-05 NOTE — TELEPHONE ENCOUNTER
Called Dr. Mullen's office to confirm if consult appointment necessary. Left message on Dr Mullen's nurse Ginger's voicemail along with my contact information and reason for calling.

## 2018-02-01 ENCOUNTER — PATIENT MESSAGE (OUTPATIENT)
Dept: ENDOCRINOLOGY | Facility: CLINIC | Age: 63
End: 2018-02-01

## 2018-02-12 ENCOUNTER — TELEPHONE (OUTPATIENT)
Dept: ENDOCRINOLOGY | Facility: CLINIC | Age: 63
End: 2018-02-12

## 2018-02-12 RX ORDER — LEVOFLOXACIN 250 MG/1
250 TABLET ORAL EVERY OTHER DAY
Qty: 4 TABLET | Refills: 0 | Status: SHIPPED | OUTPATIENT
Start: 2018-02-12 | End: 2018-12-03 | Stop reason: ALTCHOICE

## 2018-04-14 ENCOUNTER — PATIENT MESSAGE (OUTPATIENT)
Dept: ENDOCRINOLOGY | Facility: CLINIC | Age: 63
End: 2018-04-14

## 2018-08-01 ENCOUNTER — LAB VISIT (OUTPATIENT)
Dept: LAB | Facility: HOSPITAL | Age: 63
End: 2018-08-01
Attending: PHYSICIAN ASSISTANT
Payer: MEDICARE

## 2018-08-01 ENCOUNTER — OFFICE VISIT (OUTPATIENT)
Dept: ENDOCRINOLOGY | Facility: CLINIC | Age: 63
End: 2018-08-01
Payer: MEDICARE

## 2018-08-01 VITALS
HEIGHT: 69 IN | DIASTOLIC BLOOD PRESSURE: 68 MMHG | BODY MASS INDEX: 38.66 KG/M2 | SYSTOLIC BLOOD PRESSURE: 106 MMHG | HEART RATE: 69 BPM | WEIGHT: 261 LBS

## 2018-08-01 DIAGNOSIS — Z79.4 TYPE 2 DIABETES MELLITUS WITH HYPERGLYCEMIA, WITH LONG-TERM CURRENT USE OF INSULIN: ICD-10-CM

## 2018-08-01 DIAGNOSIS — E55.9 HYPOVITAMINOSIS D: ICD-10-CM

## 2018-08-01 DIAGNOSIS — Z79.4 TYPE 2 DIABETES MELLITUS WITH HYPERGLYCEMIA, WITH LONG-TERM CURRENT USE OF INSULIN: Primary | ICD-10-CM

## 2018-08-01 DIAGNOSIS — I10 ESSENTIAL HYPERTENSION: ICD-10-CM

## 2018-08-01 DIAGNOSIS — E11.65 TYPE 2 DIABETES MELLITUS WITH HYPERGLYCEMIA, WITH LONG-TERM CURRENT USE OF INSULIN: Primary | ICD-10-CM

## 2018-08-01 DIAGNOSIS — E78.5 HYPERLIPIDEMIA, UNSPECIFIED HYPERLIPIDEMIA TYPE: ICD-10-CM

## 2018-08-01 DIAGNOSIS — E11.319 DIABETIC RETINOPATHY ASSOCIATED WITH TYPE 2 DIABETES MELLITUS, MACULAR EDEMA PRESENCE UNSPECIFIED, UNSPECIFIED LATERALITY, UNSPECIFIED RETINOPATHY SEVERITY: ICD-10-CM

## 2018-08-01 DIAGNOSIS — E11.610 DIABETIC CHARCOT FOOT: ICD-10-CM

## 2018-08-01 DIAGNOSIS — E11.65 TYPE 2 DIABETES MELLITUS WITH HYPERGLYCEMIA, WITH LONG-TERM CURRENT USE OF INSULIN: ICD-10-CM

## 2018-08-01 LAB
25(OH)D3+25(OH)D2 SERPL-MCNC: 26 NG/ML
ALBUMIN SERPL BCP-MCNC: 3.5 G/DL
ALP SERPL-CCNC: 121 U/L
ALT SERPL W/O P-5'-P-CCNC: 11 U/L
ANION GAP SERPL CALC-SCNC: 8 MMOL/L
AST SERPL-CCNC: 12 U/L
BILIRUB SERPL-MCNC: 0.5 MG/DL
BUN SERPL-MCNC: 42 MG/DL
CALCIUM SERPL-MCNC: 9.5 MG/DL
CHLORIDE SERPL-SCNC: 103 MMOL/L
CHOLEST SERPL-MCNC: 164 MG/DL
CHOLEST/HDLC SERPL: 5.9 {RATIO}
CO2 SERPL-SCNC: 27 MMOL/L
CREAT SERPL-MCNC: 2.1 MG/DL
EST. GFR  (AFRICAN AMERICAN): 37.1 ML/MIN/1.73 M^2
EST. GFR  (NON AFRICAN AMERICAN): 32.1 ML/MIN/1.73 M^2
ESTIMATED AVG GLUCOSE: 263 MG/DL
GLUCOSE SERPL-MCNC: 131 MG/DL
HBA1C MFR BLD HPLC: 10.8 %
HDLC SERPL-MCNC: 28 MG/DL
HDLC SERPL: 17.1 %
LDLC SERPL CALC-MCNC: 92.4 MG/DL
NONHDLC SERPL-MCNC: 136 MG/DL
POTASSIUM SERPL-SCNC: 4.4 MMOL/L
PROT SERPL-MCNC: 6.8 G/DL
SODIUM SERPL-SCNC: 138 MMOL/L
T3 SERPL-MCNC: 82 NG/DL
T4 FREE SERPL-MCNC: 0.88 NG/DL
TRIGL SERPL-MCNC: 218 MG/DL
TSH SERPL DL<=0.005 MIU/L-ACNC: 3.48 UIU/ML

## 2018-08-01 PROCEDURE — 99999 PR PBB SHADOW E&M-EST. PATIENT-LVL III: CPT | Mod: PBBFAC,TXP,, | Performed by: PHYSICIAN ASSISTANT

## 2018-08-01 PROCEDURE — 99214 OFFICE O/P EST MOD 30 MIN: CPT | Mod: NTX,S$GLB,, | Performed by: PHYSICIAN ASSISTANT

## 2018-08-01 PROCEDURE — 3008F BODY MASS INDEX DOCD: CPT | Mod: CPTII,NTX,S$GLB, | Performed by: PHYSICIAN ASSISTANT

## 2018-08-01 PROCEDURE — 80053 COMPREHEN METABOLIC PANEL: CPT | Mod: NTX

## 2018-08-01 PROCEDURE — 3078F DIAST BP <80 MM HG: CPT | Mod: CPTII,NTX,S$GLB, | Performed by: PHYSICIAN ASSISTANT

## 2018-08-01 PROCEDURE — 82985 ASSAY OF GLYCATED PROTEIN: CPT | Mod: NTX

## 2018-08-01 PROCEDURE — 3046F HEMOGLOBIN A1C LEVEL >9.0%: CPT | Mod: CPTII,NTX,S$GLB, | Performed by: PHYSICIAN ASSISTANT

## 2018-08-01 PROCEDURE — 84378 SUGARS SINGLE QUANT: CPT | Mod: TXP

## 2018-08-01 PROCEDURE — 82306 VITAMIN D 25 HYDROXY: CPT | Mod: NTX

## 2018-08-01 PROCEDURE — 83036 HEMOGLOBIN GLYCOSYLATED A1C: CPT | Mod: NTX,59

## 2018-08-01 PROCEDURE — 84480 ASSAY TRIIODOTHYRONINE (T3): CPT | Mod: NTX

## 2018-08-01 PROCEDURE — 84443 ASSAY THYROID STIM HORMONE: CPT | Mod: TXP

## 2018-08-01 PROCEDURE — 80061 LIPID PANEL: CPT | Mod: TXP

## 2018-08-01 PROCEDURE — 84439 ASSAY OF FREE THYROXINE: CPT | Mod: NTX

## 2018-08-01 PROCEDURE — 3074F SYST BP LT 130 MM HG: CPT | Mod: CPTII,NTX,S$GLB, | Performed by: PHYSICIAN ASSISTANT

## 2018-08-01 RX ORDER — MAGNESIUM 200 MG
400 TABLET ORAL ONCE
COMMUNITY

## 2018-08-01 NOTE — PROGRESS NOTES
Subjective:      Patient ID: Hector Luis is a 63 y.o. male.    Chief Complaint:  Type 2 DM    History of Present Illness    .Hector Luis is seen as f/u for type 2 DM. He was diagnosed >20 years ago    Exercise can not due to charcot foot ; under care of podiatrist and ID    Complications :  CKD - has a nephrologist   Nephropathy   Retinopathy     Back steroid injection in 8/2016 - had caused hyperglycemia     Checks one or twice a day     ophthal : ~ 8 months ago , DR     Did not bring a log or meter    Review of Systems   Constitutional: Negative for unexpected weight change.   Gastrointestinal: Negative for constipation and diarrhea.   Endocrine: Positive for polydipsia and polyuria.        No nocturia      Musculoskeletal: Positive for arthralgias.   Skin: Positive for wound.   Neurological: Positive for weakness (gait unsteady ).   Psychiatric/Behavioral: Positive for sleep disturbance.        Sleeps in recliner and snores      Objective:   Physical Exam    There were no vitals filed for this visit.    Lab Review:  Personally reviewed labs below:   Lab Results   Component Value Date    HGBA1C 8.9 (H) 08/21/2017        Chemistry        Component Value Date/Time     08/21/2017 0819     06/19/2017 0949    K 4.5 08/21/2017 0819     08/21/2017 0819     06/19/2017 0949    CO2 25 08/21/2017 0819    BUN 37 (H) 08/21/2017 0819    CREATININE 1.9 (H) 08/21/2017 0819    CREATININE 1.66 (H) 06/19/2017 0949     (H) 08/21/2017 0819     (H) 06/19/2017 0949        Component Value Date/Time    CALCIUM 10.0 08/21/2017 0819    ALKPHOS 108 08/21/2017 0819    AST 16 08/21/2017 0819    ALT 13 08/21/2017 0819    BILITOT 0.4 08/21/2017 0819    ESTGFRAFRICA 42.1 (A) 08/21/2017 0819    EGFRNONAA 36.4 (A) 08/21/2017 0819        Lab Results   Component Value Date    CHOL 229 (H) 02/15/2013    CHOL 178 08/02/2012    CHOL 184 05/30/2012     Lab Results   Component Value Date    HDL 30 (L) 02/15/2013     HDL 32 (L) 08/02/2012    HDL 26 (L) 05/30/2012     Lab Results   Component Value Date    LDLCALC UNABLE TO CALCULATE 02/15/2013    LDLCALC UNABLE TO CALCULATE 08/02/2012    LDLCALC UNABLE TO CALCULATE 05/30/2012     Lab Results   Component Value Date    TRIG 819 (H) 02/15/2013    TRIG 505 (H) 08/02/2012    TRIG 500 (H) 05/30/2012     Lab Results   Component Value Date    CHOLHDL 13.1 (L) 02/15/2013    CHOLHDL 18.0 (L) 08/02/2012    CHOLHDL 14.1 (L) 05/30/2012      Lab Results   Component Value Date    TSH 2.397 08/02/2012      Lab Results   Component Value Date    MICALBCREAT 4,218.3 (H) 02/15/2013        Assessment:     No diagnosis found.     # type 2 diabetes mellitus with CKD, Charcot foot, neuropathy,  CAD, DR with laser treatment     - continue lantus to 25 units twice     - change to novolog [id number] with meals  plus sliding scale starting at 180   - advised to keep BG log - send me in 2 weeks   - also stressed on checking BG and maintaining BG log and being every visit     # PN with diabetes     - used to take gabapentin but not too bad per him     # charcot foot - follows with a podiatrist and ID       # HTN at goal     # Dyslipidemia      Follows with a cardiologist     # recommend sleep apnea evaluation       Plan:     Follow up:  Send me blood sugar log    F/u with me 3 months with labs prior to visit

## 2018-08-01 NOTE — PROGRESS NOTES
"CC: DM    HPI: Hector Luis was diagnosed with Type 2 DM about 30 years ago. No hospitalizations for DM. His mom and m. Grandmother for DM. No fhx of thyroid disease.    CURRENT DIABETIC MEDS: Lantus 25 BID, Novolog 15-18 units with the ss  Uses Vials or Pens: pen  Skipped/missed glycemic medications: Yes, he will miss the lunch dose.  Prandial Insulin Injections Taken Before Meals: 1 hr before    Previous medications  Metformin    BG readings are checked 2x/day   Fastins  Bedtime: 180-250s    Hypoglycemia: Yes. This occurs when he waits too long to eat after taking novolog.  Type of Glucose Meter: Freestyle    Physical Activity: None because his legs ache and he has a healing blister on his left foot. He is seeing podiatry for the blister.    Dietary Habits:   BF-eggs, toast  LH-sandwich, soup  DN-grilled chicken, half potato, veg, salad  Doesn't snack. Avoids sugary beverages.    Last Eye Exam: Dr. Aguilar, this month-- he is having cataract surgery  Last Podiatry Exam: few weeks ago    Last DM Education Attended: Never    Cardiologist is Dr. Lewis. Dr. Weiss is following for nephrology.    REVIEW OF SYSTEMS  General: no weakness or fatigue.   Eyes: no intermittent blurry vision or visual disturbances.   Cardiac: no chest pain or palpitations.   Respiratory: SANDOVAL, no cough.   GI: no abdominal pain or nausea.   Skin: no rashes or itching.   Neuro: no numbness or tingling.   Endocrine: no polyuria, polydipsia, polyphagia.   Musc: no muscle aches or jt pain  Remainder ROS negative.     Vital Signs  /68 (BP Location: Right arm, Patient Position: Sitting, BP Method: Large (Manual))   Pulse 69   Ht 5' 9" (1.753 m)   Wt 118.4 kg (261 lb 0.4 oz)   BMI 38.55 kg/m²     Personally reviewed labs below:   Hemoglobin A1C   Date Value Ref Range Status   2018 10.8 (H) 4.0 - 5.6 % Final     Comment:     ADA Screening Guidelines:  5.7-6.4%  Consistent with prediabetes  >or=6.5%  Consistent with " diabetes  High levels of fetal hemoglobin interfere with the HbA1C  assay. Heterozygous hemoglobin variants (HbS, HgC, etc)do  not significantly interfere with this assay.   However, presence of multiple variants may affect accuracy.     08/21/2017 8.9 (H) 4.0 - 5.6 % Final     Comment:     According to ADA guidelines, hemoglobin A1c <7.0% represents  optimal control in non-pregnant diabetic patients. Different  metrics may apply to specific patient populations.   Standards of Medical Care in Diabetes-2016.  For the purpose of screening for the presence of diabetes:  <5.7%     Consistent with the absence of diabetes  5.7-6.4%  Consistent with increasing risk for diabetes   (prediabetes)  >or=6.5%  Consistent with diabetes  Currently, no consensus exists for use of hemoglobin A1c  for diagnosis of diabetes for children.  This Hemoglobin A1c assay has significant interference with fetal   hemoglobin   (HbF). The results are invalid for patients with abnormal amounts of   HbF,   including those with known Hereditary Persistence   of Fetal Hemoglobin. Heterozygous hemoglobin variants (HbAS, HbAC,   HbAD, HbAE, HbA2) do not significantly interfere with this assay;   however, presence of multiple variants in a sample may impact the %   interference.     06/19/2017 10.1 (H) 3.1 - 6.5 %        Chemistry        Component Value Date/Time     08/01/2018 1100     06/19/2017 0949    K 4.4 08/01/2018 1100     08/01/2018 1100     06/19/2017 0949    CO2 27 08/01/2018 1100    BUN 42 (H) 08/01/2018 1100    CREATININE 2.1 (H) 08/01/2018 1100    CREATININE 1.66 (H) 06/19/2017 0949     (H) 08/01/2018 1100     (H) 06/19/2017 0949        Component Value Date/Time    CALCIUM 10.0 08/21/2017 0819    ALKPHOS 108 08/21/2017 0819    AST 16 08/21/2017 0819    ALT 13 08/21/2017 0819    BILITOT 0.4 08/21/2017 0819    ESTGFRAFRICA 42.1 (A) 08/21/2017 0819    EGFRNONAA 36.4 (A) 08/21/2017 0819        Lab  Results   Component Value Date    CHOL 164 08/01/2018    CHOL 229 (H) 02/15/2013    CHOL 178 08/02/2012     Lab Results   Component Value Date    HDL 28 (L) 08/01/2018    HDL 30 (L) 02/15/2013    HDL 32 (L) 08/02/2012     Lab Results   Component Value Date    LDLCALC 92.4 08/01/2018    LDLCALC UNABLE TO CALCULATE 02/15/2013    LDLCALC UNABLE TO CALCULATE 08/02/2012     Lab Results   Component Value Date    TRIG 819 (H) 02/15/2013    TRIG 505 (H) 08/02/2012    TRIG 500 (H) 05/30/2012     Lab Results   Component Value Date    CHOLHDL 17.1 (L) 08/01/2018    CHOLHDL 13.1 (L) 02/15/2013    CHOLHDL 18.0 (L) 08/02/2012       Lab Results   Component Value Date    TSH 3.484 08/01/2018       Lab Results   Component Value Date    MICALBCREAT 880.6 (H) 08/01/2018       Vit D, 25-Hydroxy   Date Value Ref Range Status   08/01/2018 26 (L) 30 - 96 ng/mL Final     Comment:     Vitamin D deficiency.........<10 ng/mL                              Vitamin D insufficiency......10-29 ng/mL       Vitamin D sufficiency........> or equal to 30 ng/mL  Vitamin D toxicity............>100 ng/mL       PHYSICAL EXAMINATION  Constitutional: Elderly male, appears well, no distress  Neck: Supple, trachea midline.No AN.  Respiratory: even and unlabored, CTA without wheezes.  Cardiovascular: RRR; no carotid bruits or murmurs.   Lymph: DP pulses  2+ bilaterally; Trace edema.   Skin: warm and dry; no injection site reactions, no acanthosis nigracans observed.  Neuro: patient alert and cooperative; CN 2-12 grossly intact  Feet: appropriate footwear.    Assessment/Plan    1. Type 2 diabetes mellitus with hyperglycemia, with long-term current use of insulin  TSH    T4, free    T3    Microalbumin/creatinine urine ratio    Comprehensive metabolic panel    Fructosamine    GlycoMark (TM)    Hemoglobin A1c   2. Essential hypertension     3. Hyperlipidemia, unspecified hyperlipidemia type  Lipid panel   4. Diabetic Charcot foot     5. Diabetic retinopathy  associated with type 2 diabetes mellitus, macular edema presence unspecified, unspecified laterality, unspecified retinopathy severity     6. Hypovitaminosis D  Vitamin D     Type 2 DM   - Increase lantus to 30 units BID  - change to novolog 15 units with meals  plus sliding scale starting at 150   - advised to keep BG log - send me in 2 weeks   - Pt is interested in the Dexcom. Advised he will need one month of BG checks 4x daily.  -Advised to take Novolog when eating a meal.  -Hypoglycemia treatment discussed.    HTN-controlled-continue meds  [id number] ASA  Diabetic charcot foot-stable-f/u with podiatry  Diabetic retinopathy-f/u with the optho  Hypovitaminosis D-check vitamin d     FOLLOW UP  F/u in 2 months

## 2018-08-03 LAB — FRUCTOSAMINE SERPL-SCNC: 496 UMOL /L (ref 151–300)

## 2018-08-05 LAB — GLYCOMARK (TM): 1.3 UG/ML

## 2018-10-02 LAB
% SATURATION: 18 %
ALBUMIN SERPL-MCNC: 3.4 G/DL (ref 3.1–4.7)
ALP SERPL-CCNC: 78 IU/L (ref 40–104)
ALT (SGPT): 11 IU/L (ref 3–33)
AST SERPL-CCNC: 13 IU/L (ref 10–40)
BASOPHILS NFR BLD: 0.1 K/UL (ref 0–0.2)
BASOPHILS NFR BLD: 1.1 %
BILIRUB SERPL-MCNC: 0.9 MG/DL (ref 0.3–1)
BUN SERPL-MCNC: 48 MG/DL (ref 8–20)
CALCIUM SERPL-MCNC: 9.4 MG/DL (ref 7.7–10.4)
CHLORIDE: 101 MMOL/L (ref 98–110)
CO2 SERPL-SCNC: 25.6 MMOL/L (ref 22.8–31.6)
CREATININE: 2.38 MG/DL (ref 0.6–1.4)
EOSINOPHIL NFR BLD: 0.4 K/UL (ref 0–0.7)
EOSINOPHIL NFR BLD: 5.5 %
ERYTHROCYTE [DISTWIDTH] IN BLOOD BY AUTOMATED COUNT: 16.7 % (ref 11.7–14.9)
FERRITIN SERPL-MCNC: 64 NG/ML (ref 37–201)
GLUCOSE: 113 MG/DL (ref 70–99)
GRAN #: 4.6 K/UL (ref 1.4–6.5)
GRAN%: 73.1 %
HCT VFR BLD AUTO: 40 % (ref 39–55)
HGB BLD-MCNC: 12.5 G/DL (ref 14–16)
IMMATURE GRANS (ABS): 0 K/UL (ref 0–1)
IMMATURE GRANULOCYTES: 0.3 %
IRON: 48 MCG/DL (ref 32–176)
LYMPH #: 0.7 K/UL (ref 1.2–3.4)
LYMPH%: 11.2 %
MCH RBC QN AUTO: 27.5 PG (ref 25–35)
MCHC RBC AUTO-ENTMCNC: 31.3 G/DL (ref 31–36)
MCV RBC AUTO: 87.9 FL (ref 80–100)
MONO #: 0.6 K/UL (ref 0.1–0.6)
MONO%: 8.8 %
NUCLEATED RBCS: 0 %
PHOSPHATE FLD-MCNC: 4 MG/DL (ref 2.5–4.9)
PLATELET # BLD AUTO: 221 K/UL (ref 140–440)
PMV BLD AUTO: 10.2 FL (ref 8.8–12.7)
POTASSIUM SERPL-SCNC: 4 MMOL/L (ref 3.5–5)
PROT SERPL-MCNC: 6.9 G/DL (ref 6–8.2)
RBC # BLD AUTO: 4.55 M/UL (ref 4.3–5.9)
SODIUM: 140 MMOL/L (ref 134–144)
TOTAL IRON BINDING CAPACITY: 266 MCG/DL (ref 177–435)
WBC # BLD AUTO: 6.3 K/UL (ref 5–10)

## 2018-10-03 ENCOUNTER — TELEPHONE (OUTPATIENT)
Dept: TRANSPLANT | Facility: CLINIC | Age: 63
End: 2018-10-03

## 2018-10-03 NOTE — TELEPHONE ENCOUNTER
Spoke to Melinda,  in Dr Mullen's office. Melinda to confirm if referral to [facility] clinic can be closed or not and plan to call me back. My contact information given.

## 2018-10-09 LAB
ALBUMIN SERPL-MCNC: 3.2 G/DL (ref 3.1–4.7)
ALP SERPL-CCNC: 94 IU/L (ref 40–104)
ALT (SGPT): 14 IU/L (ref 3–33)
AST SERPL-CCNC: 15 IU/L (ref 10–40)
BASOPHILS NFR BLD: 0 K/UL (ref 0–0.2)
BASOPHILS NFR BLD: 0.6 %
BILIRUB SERPL-MCNC: 0.9 MG/DL (ref 0.3–1)
BUN SERPL-MCNC: 39 MG/DL (ref 8–20)
CALCIUM SERPL-MCNC: 9.2 MG/DL (ref 7.7–10.4)
CHLORIDE: 106 MMOL/L (ref 98–110)
CO2 SERPL-SCNC: 21.7 MMOL/L (ref 22.8–31.6)
CREATININE: 1.96 MG/DL (ref 0.6–1.4)
CRP SERPL-MCNC: 3.29 MG/DL (ref 0–1.4)
EOSINOPHIL NFR BLD: 0.4 K/UL (ref 0–0.7)
EOSINOPHIL NFR BLD: 5.4 %
ERYTHROCYTE [DISTWIDTH] IN BLOOD BY AUTOMATED COUNT: 17.4 % (ref 11.7–14.9)
GLUCOSE: 157 MG/DL (ref 70–99)
GRAN #: 4.7 K/UL (ref 1.4–6.5)
GRAN%: 73.5 %
HCT VFR BLD AUTO: 40.5 % (ref 39–55)
HGB BLD-MCNC: 12.5 G/DL (ref 14–16)
IMMATURE GRANS (ABS): 0 K/UL (ref 0–1)
IMMATURE GRANULOCYTES: 0.3 %
LYMPH #: 0.7 K/UL (ref 1.2–3.4)
LYMPH%: 11 %
MCH RBC QN AUTO: 27.2 PG (ref 25–35)
MCHC RBC AUTO-ENTMCNC: 30.9 G/DL (ref 31–36)
MCV RBC AUTO: 88.2 FL (ref 80–100)
MONO #: 0.6 K/UL (ref 0.1–0.6)
MONO%: 9.2 %
NUCLEATED RBCS: 0 %
PLATELET # BLD AUTO: 168 K/UL (ref 140–440)
PMV BLD AUTO: 10 FL (ref 8.8–12.7)
POTASSIUM SERPL-SCNC: 3.8 MMOL/L (ref 3.5–5)
PROT SERPL-MCNC: 6.9 G/DL (ref 6–8.2)
RBC # BLD AUTO: 4.59 M/UL (ref 4.3–5.9)
SODIUM: 141 MMOL/L (ref 134–144)
WBC # BLD AUTO: 6.4 K/UL (ref 5–10)

## 2018-10-10 ENCOUNTER — OFFICE VISIT (OUTPATIENT)
Dept: INFECTIOUS DISEASES | Facility: CLINIC | Age: 63
End: 2018-10-10
Payer: MEDICARE

## 2018-10-10 VITALS
HEART RATE: 75 BPM | TEMPERATURE: 97 F | DIASTOLIC BLOOD PRESSURE: 86 MMHG | BODY MASS INDEX: 38.95 KG/M2 | OXYGEN SATURATION: 98 % | SYSTOLIC BLOOD PRESSURE: 134 MMHG | WEIGHT: 263 LBS | HEIGHT: 69 IN

## 2018-10-10 DIAGNOSIS — A49.8 PSEUDOMONAS AERUGINOSA INFECTION: ICD-10-CM

## 2018-10-10 DIAGNOSIS — N18.30 TYPE 2 DIABETES MELLITUS WITH STAGE 3 CHRONIC KIDNEY DISEASE, WITH LONG-TERM CURRENT USE OF INSULIN: ICD-10-CM

## 2018-10-10 DIAGNOSIS — A49.8 ESCHERICHIA COLI (E. COLI) INFECTION: ICD-10-CM

## 2018-10-10 DIAGNOSIS — M86.672 OTHER CHRONIC OSTEOMYELITIS OF LEFT FOOT: Primary | ICD-10-CM

## 2018-10-10 DIAGNOSIS — B95.2 ENTEROCOCCUS AS THE CAUSE OF DISEASES CLASSIFIED ELSEWHERE: ICD-10-CM

## 2018-10-10 DIAGNOSIS — Z79.4 TYPE 2 DIABETES MELLITUS WITH STAGE 3 CHRONIC KIDNEY DISEASE, WITH LONG-TERM CURRENT USE OF INSULIN: ICD-10-CM

## 2018-10-10 DIAGNOSIS — Z79.2 ENCOUNTER FOR LONG-TERM (CURRENT) USE OF ANTIBIOTICS: ICD-10-CM

## 2018-10-10 DIAGNOSIS — I73.9 PERIPHERAL ARTERIAL DISEASE: ICD-10-CM

## 2018-10-10 DIAGNOSIS — E11.22 TYPE 2 DIABETES MELLITUS WITH STAGE 3 CHRONIC KIDNEY DISEASE, WITH LONG-TERM CURRENT USE OF INSULIN: ICD-10-CM

## 2018-10-10 PROBLEM — M86.9 OSTEOMYELITIS OF LEFT FOOT: Status: ACTIVE | Noted: 2018-10-10

## 2018-10-10 PROCEDURE — 3075F SYST BP GE 130 - 139MM HG: CPT | Mod: ,,, | Performed by: INTERNAL MEDICINE

## 2018-10-10 PROCEDURE — 3008F BODY MASS INDEX DOCD: CPT | Mod: ,,, | Performed by: INTERNAL MEDICINE

## 2018-10-10 PROCEDURE — 3079F DIAST BP 80-89 MM HG: CPT | Mod: ,,, | Performed by: INTERNAL MEDICINE

## 2018-10-10 PROCEDURE — 1101F PT FALLS ASSESS-DOCD LE1/YR: CPT | Mod: ,,, | Performed by: INTERNAL MEDICINE

## 2018-10-10 PROCEDURE — 3046F HEMOGLOBIN A1C LEVEL >9.0%: CPT | Mod: ,,, | Performed by: INTERNAL MEDICINE

## 2018-10-10 PROCEDURE — 99214 OFFICE O/P EST MOD 30 MIN: CPT | Mod: ,,, | Performed by: INTERNAL MEDICINE

## 2018-10-10 NOTE — PROGRESS NOTES
Subjective:       Patient ID: Hector Luis is a 63 y.o. male.    Chief Complaint:: Follow-up (Hospital )    HPI seen in [facility] for diabetic foot infection of the left foot, plantar surface of the first metatarsal head. This required debridement and a bone biopsy by Dr. Gore, which grew enterococcus and Escherichia coli and Bacteroides. The tissue cultures also grew a pseudomonas aeruginosa. And Proteus. He also had a percutaneous angioplasty of the left leg by Dr. Jeff.  At the time of discharge she was released on Unasyn and oral Levaquin for 2 weeks. He has been followed by Dr. Tri Allison and the wound is not showing any signs of infection anymore but he is having difficulty healing. This is common for him. His blood sugars are very well controlled now. He has not had any side effects from the IV antibiotics. On the oral antibiotics he felt some muscle pain. His wife feels that the antibiotics are causing him to retain fluid and this is possible. He does not sleep well, sleeps in a chair and has orthopnea which he has had for the last 3 years, since his coronary bypass surgery.    Review of patient's allergies indicates:   Allergen Reactions    Codeine      Past Medical History:   Diagnosis Date    Coronary artery disease     Diabetes mellitus     Diabetes mellitus type II     Hyperlipidemia     Hypertension     Obesity     Peripheral arterial disease     Type 2 diabetes mellitus with diabetic arthropathy, with long-term current use of insulin 4/24/2017    Type 2 diabetes mellitus with stage 3 chronic kidney disease, with long-term current use of insulin    Osteomyelitis of the right foot, fifth metatarsal head and toes 2017×2  Charcot arthropathy, bilateral  Peripheral vascular disease  Atrial fibrillation  Diabetic neuropathy  Chronic kidney disease  Lumbar disc disease  Congestive heart failure      Past Surgical History:   Procedure Laterality Date    FRACTURE SURGERY      HAND  "SURGERY, trigger finger repair      Coronary artery bypass  Left wrist fusion  Laser surgery for diabetic retinopathy  Multiple foot debridements 2018  Social History     Tobacco Use    Smoking status: Former Smoker     Types: Cigarettes     Last attempt to quit: 1977     Years since quittin.2    Smokeless tobacco: Never Used   Substance Use Topics    Alcohol use: Yes     Comment: occasionally     Social History     Occupational History    Not on file     Family History   Problem Relation Age of Onset    Cancer Mother     Diabetes Mother     Heart disease Father     Stroke Father     Hypertension Father     Diabetes Maternal Grandmother          Review of Systems    Constitutional: No fever, chills, sweats, fatigue, weakness, weight loss    Eyes: No change in vision,    ENT: No sore throat, mouth pains, or lesions    Cardiovascular: No chest pain, chronic orthopnea and SANDOVAL, and mildly worsening leg edema of the right leg greater than left leg     Respiratory: Chronic SANDOVAL, no cough, wheeze, sputum, or hemoptysis    Gastrointestinal: No abdominal pain, nausea, vomiting, diarrhea, or focal abdominal pain    Genitourinary:     Musculoskeletal: No new pain, joint swelling, or injuries    Integumentary: No new rashes, skin lesions,    Neurological: No dizziness, vertigo, unusual headaches, falls    Psychiatric: No anxiety, depression    Endocrine: Blood sugars are well controlled    Lymphatic: No lymphadenopathy, blood loss,   VAD: No tenderness, redness, swelling, phlebitis    Objective:      Blood pressure 134/86, pulse 75, temperature 96.8 °F (36 °C), temperature source Oral, height 5' 9" (1.753 m), weight 119.3 kg (263 lb), SpO2 98 %. Body mass index is 38.84 kg/m².  Physical Exam      General: Alert and attentive, cooperative and in no distress    Eyes: Pupils equal, round, reactive to light, anicteric, EOMI    Neck: Supple,     ENT: no oral lesions, teeth in good condition, no " thrush    Integumentary: Skin without rashes,  Cardiovascular: Regular rate and rhythm,    Respiratory: Lungs clear without wheezes, trace rales,   Gastrointestinal:  Soft, obese    Vascular: Chronic peripheral edema warm and well perfused    Musculoskeletal: Ambulates minimal difficulty, no acute arthritis, synovitis or myositis.    Lymphatic: No cervical, LAD    Neurological: Normal LOC, cranial nerves, speech,, gait    Psychiatric: Normal mood, speech,  demeanor     Wound: Right lateral foot has 2 openings, one on the plantar surface which still probes to bone and is filled with slough and not healthy tissue. It is at least 1/2 cm deep. The lateral foot wound has an opening that reveals the tendons. There is no purulence this tunnels around the edge by 0.5 cm circumferentially. There is no cellulitis. The tissue is pale and not granulating    VAD: Left arm PICC line has no redness tenderness or drainage him a no phlebitis       Recent Diagnostics: Reviewed weekly lab in [facility] and [facility]          Assessment and Plan:           Other chronic osteomyelitis of left foot    Enterococcus as the cause of diseases classified elsewhere    Escherichia coli (E. coli) infection    Pseudomonas aeruginosa infection    Type 2 diabetes mellitus with stage 3 chronic kidney disease, with long-term current use of insulin    Encounter for long-term (current) use of antibiotics    Peripheral arterial disease      We will call [employer] [employer] about changing the unasyn to zosyn 3.375 IV every 8 hours and will extend the end date until 10/26.   Please return 10/26  Please take a probiotic every day  Keep up the good sugars  Ask Dr. Mullen if you can take an extra torsemide three days a week    This note was created using Dragon voice recognition software that occasionally misinterpreted phrases or words.

## 2018-10-10 NOTE — PATIENT INSTRUCTIONS
We will call [employer] about changing the unasyn to zosyn 3.375 IV every 8 hours and will extend the end date until 10/26.   Please return 10/26  Please take a probiotic every day  Keep up the good sugars  Ask Dr. Mullen if you can take an extra 1/2 or whole  torsemide three days a week

## 2018-10-16 LAB
ALBUMIN SERPL-MCNC: 3.2 G/DL (ref 3.1–4.7)
ALP SERPL-CCNC: 112 IU/L (ref 40–104)
ALT (SGPT): 15 IU/L (ref 3–33)
AST SERPL-CCNC: 18 IU/L (ref 10–40)
BASOPHILS NFR BLD: 0.1 K/UL (ref 0–0.2)
BASOPHILS NFR BLD: 0.8 %
BILIRUB SERPL-MCNC: 0.9 MG/DL (ref 0.3–1)
BUN SERPL-MCNC: 40 MG/DL (ref 8–20)
CALCIUM SERPL-MCNC: 9 MG/DL (ref 7.7–10.4)
CHLORIDE: 101 MMOL/L (ref 98–110)
CO2 SERPL-SCNC: 26.3 MMOL/L (ref 22.8–31.6)
CREATININE: 2.29 MG/DL (ref 0.6–1.4)
CRP SERPL-MCNC: 3.31 MG/DL (ref 0–1.4)
EOSINOPHIL NFR BLD: 0.3 K/UL (ref 0–0.7)
EOSINOPHIL NFR BLD: 5.1 %
ERYTHROCYTE [DISTWIDTH] IN BLOOD BY AUTOMATED COUNT: 18.6 % (ref 11.7–14.9)
GLUCOSE: 110 MG/DL (ref 70–99)
GRAN #: 4.8 K/UL (ref 1.4–6.5)
GRAN%: 74.3 %
HCT VFR BLD AUTO: 42.8 % (ref 39–55)
HGB BLD-MCNC: 12.9 G/DL (ref 14–16)
IMMATURE GRANS (ABS): 0 K/UL (ref 0–1)
IMMATURE GRANULOCYTES: 0.5 %
LYMPH #: 0.6 K/UL (ref 1.2–3.4)
LYMPH%: 9 %
MCH RBC QN AUTO: 26.9 PG (ref 25–35)
MCHC RBC AUTO-ENTMCNC: 30.1 G/DL (ref 31–36)
MCV RBC AUTO: 89.4 FL (ref 80–100)
MONO #: 0.7 K/UL (ref 0.1–0.6)
MONO%: 10.3 %
NUCLEATED RBCS: 0 %
PLATELET # BLD AUTO: 171 K/UL (ref 140–440)
PMV BLD AUTO: 10.4 FL (ref 8.8–12.7)
POTASSIUM SERPL-SCNC: 3.7 MMOL/L (ref 3.5–5)
PROT SERPL-MCNC: 7.1 G/DL (ref 6–8.2)
RBC # BLD AUTO: 4.79 M/UL (ref 4.3–5.9)
SODIUM: 139 MMOL/L (ref 134–144)
WBC # BLD AUTO: 6.4 K/UL (ref 5–10)

## 2018-10-17 ENCOUNTER — TELEPHONE (OUTPATIENT)
Dept: INFECTIOUS DISEASES | Facility: CLINIC | Age: 63
End: 2018-10-17

## 2018-10-17 RX ORDER — ONDANSETRON 4 MG/1
4 TABLET, FILM COATED ORAL EVERY 6 HOURS PRN
Qty: 40 TABLET | Refills: 1 | Status: SHIPPED | OUTPATIENT
Start: 2018-10-17

## 2018-10-17 NOTE — TELEPHONE ENCOUNTER
Has been vomiting x 3 days. No nausea medication in possession. No abd pain.   He has no other new medications other the zosyn, which he has taken without difficulty for much longer period of time. No constipation.   rx zofran 4 mg q6 prn sent to his pharmacy  He is to keep well hydrated. He is able to tolerate liquids and gatorade and smoothies.   He is to report poor progress.

## 2018-10-17 NOTE — TELEPHONE ENCOUNTER
Patients wife called states that last week when he was prescribed Piperacillin . She states that he is vomiting an medication seems to be upsetting his stomach . Can we give him something else ?

## 2018-10-23 LAB
ALBUMIN SERPL-MCNC: 3.2 G/DL (ref 3.1–4.7)
ALP SERPL-CCNC: 166 IU/L (ref 40–104)
ALT (SGPT): 34 IU/L (ref 3–33)
AST SERPL-CCNC: 31 IU/L (ref 10–40)
BASOPHILS NFR BLD: 0.1 K/UL (ref 0–0.2)
BASOPHILS NFR BLD: 1 %
BILIRUB SERPL-MCNC: 1.2 MG/DL (ref 0.3–1)
BUN SERPL-MCNC: 48 MG/DL (ref 8–20)
CALCIUM SERPL-MCNC: 9.6 MG/DL (ref 7.7–10.4)
CHLORIDE: 101 MMOL/L (ref 98–110)
CO2 SERPL-SCNC: 27.1 MMOL/L (ref 22.8–31.6)
CREATININE: 2.48 MG/DL (ref 0.6–1.4)
CRP SERPL-MCNC: 3.96 MG/DL (ref 0–1.4)
EOSINOPHIL NFR BLD: 0.3 K/UL (ref 0–0.7)
EOSINOPHIL NFR BLD: 4.2 %
ERYTHROCYTE [DISTWIDTH] IN BLOOD BY AUTOMATED COUNT: 18.9 % (ref 11.7–14.9)
GLUCOSE: 96 MG/DL (ref 70–99)
GRAN #: 5.3 K/UL (ref 1.4–6.5)
GRAN%: 73.8 %
HCT VFR BLD AUTO: 45.2 % (ref 39–55)
HGB BLD-MCNC: 13.8 G/DL (ref 14–16)
IMMATURE GRANS (ABS): 0 K/UL (ref 0–1)
IMMATURE GRANULOCYTES: 0.3 %
LYMPH #: 0.7 K/UL (ref 1.2–3.4)
LYMPH%: 9.8 %
MCH RBC QN AUTO: 27.1 PG (ref 25–35)
MCHC RBC AUTO-ENTMCNC: 30.5 G/DL (ref 31–36)
MCV RBC AUTO: 88.6 FL (ref 80–100)
MONO #: 0.8 K/UL (ref 0.1–0.6)
MONO%: 10.9 %
NUCLEATED RBCS: 0 %
PLATELET # BLD AUTO: 193 K/UL (ref 140–440)
PMV BLD AUTO: 9.9 FL (ref 8.8–12.7)
POTASSIUM SERPL-SCNC: 4.5 MMOL/L (ref 3.5–5)
PROT SERPL-MCNC: 6.8 G/DL (ref 6–8.2)
RBC # BLD AUTO: 5.1 M/UL (ref 4.3–5.9)
SODIUM: 140 MMOL/L (ref 134–144)
WBC # BLD AUTO: 7.2 K/UL (ref 5–10)

## 2018-10-24 ENCOUNTER — TELEPHONE (OUTPATIENT)
Dept: INFECTIOUS DISEASES | Facility: CLINIC | Age: 63
End: 2018-10-24

## 2018-10-24 NOTE — TELEPHONE ENCOUNTER
----- Message from Cheyenne Mar MD sent at 10/23/2018  3:45 PM CDT -----  Please check on him. His labs are looking a little dehydrated. He was vomiting last week and we sent in zofran

## 2018-10-26 ENCOUNTER — OFFICE VISIT (OUTPATIENT)
Dept: INFECTIOUS DISEASES | Facility: CLINIC | Age: 63
End: 2018-10-26
Payer: MEDICARE

## 2018-10-26 VITALS
SYSTOLIC BLOOD PRESSURE: 132 MMHG | OXYGEN SATURATION: 98 % | TEMPERATURE: 98 F | HEART RATE: 75 BPM | BODY MASS INDEX: 39.4 KG/M2 | HEIGHT: 69 IN | DIASTOLIC BLOOD PRESSURE: 86 MMHG | WEIGHT: 266 LBS

## 2018-10-26 DIAGNOSIS — B95.2 ENTEROCOCCUS AS THE CAUSE OF DISEASES CLASSIFIED ELSEWHERE: ICD-10-CM

## 2018-10-26 DIAGNOSIS — A49.8 PSEUDOMONAS AERUGINOSA INFECTION: ICD-10-CM

## 2018-10-26 DIAGNOSIS — Z79.2 ENCOUNTER FOR LONG-TERM (CURRENT) USE OF ANTIBIOTICS: ICD-10-CM

## 2018-10-26 DIAGNOSIS — M86.672 OTHER CHRONIC OSTEOMYELITIS OF LEFT FOOT: Primary | ICD-10-CM

## 2018-10-26 DIAGNOSIS — I73.9 PERIPHERAL ARTERIAL DISEASE: ICD-10-CM

## 2018-10-26 DIAGNOSIS — A49.8 ESCHERICHIA COLI (E. COLI) INFECTION: ICD-10-CM

## 2018-10-26 PROCEDURE — 3075F SYST BP GE 130 - 139MM HG: CPT | Mod: ,,, | Performed by: INTERNAL MEDICINE

## 2018-10-26 PROCEDURE — G0008 ADMIN INFLUENZA VIRUS VAC: HCPCS | Mod: ,,, | Performed by: INTERNAL MEDICINE

## 2018-10-26 PROCEDURE — 1101F PT FALLS ASSESS-DOCD LE1/YR: CPT | Mod: ,,, | Performed by: INTERNAL MEDICINE

## 2018-10-26 PROCEDURE — 99214 OFFICE O/P EST MOD 30 MIN: CPT | Mod: 25,,, | Performed by: INTERNAL MEDICINE

## 2018-10-26 PROCEDURE — 90662 IIV NO PRSV INCREASED AG IM: CPT | Mod: ,,, | Performed by: INTERNAL MEDICINE

## 2018-10-26 PROCEDURE — 3079F DIAST BP 80-89 MM HG: CPT | Mod: ,,, | Performed by: INTERNAL MEDICINE

## 2018-10-26 PROCEDURE — 3008F BODY MASS INDEX DOCD: CPT | Mod: ,,, | Performed by: INTERNAL MEDICINE

## 2018-10-26 RX ORDER — TORSEMIDE 20 MG/1
20 TABLET ORAL DAILY
COMMUNITY

## 2018-10-26 NOTE — PATIENT INSTRUCTIONS
Please go to [facility] for new picc line and first doses of daptomycin 700 mg IV and cefepime 2 g IV  And new midline catheter    Home IV orders: daptomycin 700 mg IV every other day for 3 weeks, and cefepime 2 g IV daily x 3 weeks  Weekly cbc cmp crp cpk    Return in 2-3 weeks     Flu vaccine

## 2018-10-26 NOTE — PROGRESS NOTES
Subjective:       Patient ID: Hector Luis is a 63 y.o. male.    Chief Complaint:: Follow-up (Osteomyelitis)    HPI seen in [facility] for diabetic foot infection of the left foot, plantar surface of the first metatarsal head. This required debridement and a bone biopsy by Dr. Gore, which grew enterococcus and Escherichia coli and Bacteroides. The tissue cultures also grew a pseudomonas aeruginosa. And Proteus. He also had a percutaneous angioplasty of the left leg by Dr. Jeff.  At the time of discharge she was released on Unasyn and oral Levaquin for 2 weeks. He has been followed by Dr. Gore and the wound is not showing any signs of infection anymore but he is having difficulty healing. This is common for him. His blood sugars are very well controlled now. He has not had any side effects from the IV antibiotics. On the oral antibiotics he felt some muscle pain. His wife feels that the antibiotics are causing him to retain fluid and this is possible. He does not sleep well, sleeps in a chair and has orthopnea which he has had for the last 3 years, since his coronary bypass surgery.    10/26: he had nausea and vomiting which he attributed to the zosyn. Despite zofran, he still could not eat. His BUN and CR are higher but he feels much better, not having taken any zosyn since 10/24. They tell me Dr. Gore is pleased with his progress. He is anxious to get the line out. We reviewed that I do not have a good oral option for him and his foot is really not that much better. He had achilles tendon pain while on levaquin and it would not be wise to use this or cipro.     Review of patient's allergies indicates:   Allergen Reactions    Codeine      Past Medical History:   Diagnosis Date    Coronary artery disease     Diabetes mellitus     Diabetes mellitus type II     Hyperlipidemia     Hypertension     Obesity     Peripheral arterial disease     Type 2 diabetes mellitus with diabetic arthropathy,  with long-term current use of insulin 2017    Type 2 diabetes mellitus with stage 3 chronic kidney disease, with long-term current use of insulin    Osteomyelitis of the right foot, fifth metatarsal head and toes 2017×2,  Osteomyelitis  Right foot, polymicrobial (pseudomonas, enterococcus, Ecoli) 2018  Charcot arthropathy, bilateral  Peripheral vascular disease  Atrial fibrillation  Diabetic neuropathy  Chronic kidney disease  Lumbar disc disease  Congestive heart failure      Procedure Laterality Date          HAND SURGERY, trigger finger repair      Coronary artery bypass  Left wrist fusion  Laser surgery for diabetic retinopathy  Multiple foot debridements ,   Past Surgical History:   Procedure Laterality Date    BALLOON ANGIOPLASTY, ARTERY Left 2018    Left lower extremity    FRACTURE SURGERY      HAND SURGERY           Social History     Tobacco Use    Smoking status: Former Smoker     Types: Cigarettes     Last attempt to quit: 1977     Years since quittin.2    Smokeless tobacco: Never Used   Substance Use Topics    Alcohol use: Yes     Comment: occasionally     Social History     Occupational History    Not on file     Family History   Problem Relation Age of Onset    Cancer Mother     Diabetes Mother     Heart disease Father     Stroke Father     Hypertension Father     Diabetes Maternal Grandmother          Review of Systems    Constitutional: No fever, chills, sweats, fatigue,     Eyes: No change in vision,    ENT: No sore throat, mouth pains, or lesions    Cardiovascular: No chest pain, chronic orthopnea and SANDOVAL,     Respiratory: Chronic SANDOVAL, no cough, wheeze, sputum, or hemoptysis    Gastrointestinal: has nausea throughout his course of zosyn, only mildly temporized by zofran    Genitourinary:     Musculoskeletal: No new pain, joint swelling, or injuries    Integumentary: No new rashes, skin lesions,    Neurological: No dizziness, vertigo, unusual headaches,  "falls    Psychiatric: No anxiety, depression    Endocrine: Blood sugars are well controlled    Lymphatic: No lymphadenopathy, blood loss,   VAD: No tenderness, redness, swelling, phlebitis    Objective:      Blood pressure 132/86, pulse 75, temperature 97.5 °F (36.4 °C), temperature source Oral, height 5' 9" (1.753 m), weight 120.7 kg (266 lb), SpO2 98 %. Body mass index is 39.28 kg/m².  Physical Exam      General: Alert and attentive, cooperative and in no distress  Eyes:  anicteric, EOMI  Neck: Supple,  ENT: no oral lesions, teeth in good condition, no thrush    Integumentary: Skin without rashes,  Cardiovascular:   Respiratory:   Gastrointestinal:    Vascular: Chronic peripheral edema warm and well perfused    Musculoskeletal: Ambulates minimal difficulty, no acute arthritis, synovitis or myositis.    Lymphatic: No cervical, LAD    Neurological: Normal LOC, cranial nerves, speech,, gait    Psychiatric: Normal mood, speech,  demeanor     Wound: Right lateral foot has 2 openings, one on the plantar surface which still probes to bone(4.5 cm)  and is filled with slough and not healthy tissue. . The lateral foot wound has an opening that reveals the tendons. There is no purulence this tunnels around the edge by 0.5 cm circumferentially. There is no cellulitis. The tissue is pale and not granulating and it appears to be a little smaller than last time    VAD: Left arm midline line has no redness tenderness or drainage him a no phlebitis       Recent Diagnostics: Reviewed weekly lab in [facility] and [facility]          Assessment and Plan:           Other chronic osteomyelitis of left foot    Pseudomonas aeruginosa infection    Escherichia coli (E. coli) infection    Enterococcus as the cause of diseases classified elsewhere    Encounter for long-term (current) use of antibiotics    Peripheral arterial disease    Other orders  -     Influenza - High Dose (65+) (PF) (IM)       Flu vaccine today  Counseled that he is at risk for " relapse of infection because of persistence of abnormal tissue and poor healing.   Please go to [facility] for new picc line and first doses of daptomycin 700 mg IV and cefepime 2 g IV  And new midline catheter    Home IV orders: daptomycin 700 mg IV every other day for 3 weeks, and cefepime 2 g IV daily x 3 weeks  Weekly cbc cmp crp cpk    Return in 2-3 weeks    ADDENDUM: ADDING THE DAPTOMYCIN DROVE HIS WEEKLY COST UP TO 1000$, AND THEREFORE WE WILL DROP THE DAPTOMYCIN AND JUST DO CEFEPIME    This note was created using Dragon voice recognition software that occasionally misinterpreted phrases or words.

## 2018-10-30 ENCOUNTER — TELEPHONE (OUTPATIENT)
Dept: INFECTIOUS DISEASES | Facility: CLINIC | Age: 63
End: 2018-10-30

## 2018-10-30 LAB
ALBUMIN SERPL-MCNC: 3 G/DL (ref 3.1–4.7)
ALP SERPL-CCNC: 160 IU/L (ref 40–104)
ALT (SGPT): 17 IU/L (ref 3–33)
AST SERPL-CCNC: 16 IU/L (ref 10–40)
BASOPHILS NFR BLD: 0.1 K/UL (ref 0–0.2)
BASOPHILS NFR BLD: 0.7 %
BILIRUB SERPL-MCNC: 1.3 MG/DL (ref 0.3–1)
BUN SERPL-MCNC: 56 MG/DL (ref 8–20)
CALCIUM SERPL-MCNC: 9.1 MG/DL (ref 7.7–10.4)
CHLORIDE: 101 MMOL/L (ref 98–110)
CK SERPL-CCNC: 189 IU/L (ref 18–170)
CO2 SERPL-SCNC: 23.8 MMOL/L (ref 22.8–31.6)
CREATININE: 2.35 MG/DL (ref 0.6–1.4)
CRP SERPL-MCNC: 3.93 MG/DL (ref 0–1.4)
EOSINOPHIL NFR BLD: 0.2 K/UL (ref 0–0.7)
EOSINOPHIL NFR BLD: 2.5 %
ERYTHROCYTE [DISTWIDTH] IN BLOOD BY AUTOMATED COUNT: 18.6 % (ref 11.7–14.9)
GLUCOSE: 126 MG/DL (ref 70–99)
GRAN #: 6.5 K/UL (ref 1.4–6.5)
GRAN%: 76.9 %
HCT VFR BLD AUTO: 42.5 % (ref 39–55)
HGB BLD-MCNC: 13.1 G/DL (ref 14–16)
IMMATURE GRANS (ABS): 0 K/UL (ref 0–1)
IMMATURE GRANULOCYTES: 0.4 %
LYMPH #: 0.8 K/UL (ref 1.2–3.4)
LYMPH%: 9.6 %
MCH RBC QN AUTO: 27 PG (ref 25–35)
MCHC RBC AUTO-ENTMCNC: 30.8 G/DL (ref 31–36)
MCV RBC AUTO: 87.4 FL (ref 80–100)
MONO #: 0.8 K/UL (ref 0.1–0.6)
MONO%: 9.9 %
NUCLEATED RBCS: 0 %
PLATELET # BLD AUTO: 253 K/UL (ref 140–440)
PMV BLD AUTO: 9.9 FL (ref 8.8–12.7)
POTASSIUM SERPL-SCNC: 4.9 MMOL/L (ref 3.5–5)
PROT SERPL-MCNC: 6.5 G/DL (ref 6–8.2)
RBC # BLD AUTO: 4.86 M/UL (ref 4.3–5.9)
SODIUM: 137 MMOL/L (ref 134–144)
WBC # BLD AUTO: 8.5 K/UL (ref 5–10)

## 2018-10-30 NOTE — TELEPHONE ENCOUNTER
----- Message from Cheyenne Mar MD sent at 10/30/2018  2:32 PM CDT -----  Please let mr. briceno know that his labs still look dehydrated and I would like him to push fluid for a few days

## 2018-10-30 NOTE — TELEPHONE ENCOUNTER
Said he drinks tons of fluids all day!   Coffee, tomato juice, water( lots and lots)  And gatorade.  Wants to know if the antibiotics could have something to do with him looking like he's dehydrated on his labs.....STH

## 2018-10-31 ENCOUNTER — TELEPHONE (OUTPATIENT)
Dept: INFECTIOUS DISEASES | Facility: CLINIC | Age: 63
End: 2018-10-31

## 2018-10-31 NOTE — TELEPHONE ENCOUNTER
I phoned Hector Luis and discussed his rationale for stopping the antibiotics. He continues to feel nauseated despite changing from Zosyn to cefepime. He is concerned about his elevated kidney tests. He is tired of the IV problems. He understands that stopping antibiotics puts his foot at risk for worsening. I asked him to send me a note in writing regarding his wishes and understanding. He has an appointment with me in a proximally 2 weeks. He was advised that if his foot were to begin to worsen that he would need to come to the hospital. He expressed understanding  I notified [facility] of his cancellation.    ?gastroparesis  Will suggest he take acid suppression meds for a while

## 2018-10-31 NOTE — TELEPHONE ENCOUNTER
----- Message from Camila Poe sent at 10/31/2018  8:36 AM CDT -----  Contact: Taryn Munroe had to take out his picc line yesterday. It was very sore and bruised. Taryn said that something was wrong with it. Mr Luis wants to know if he can take a break for a bit. They will watch it. He has just been so sick from the medication he's like a break. Has an appointment to go in at 10 am to get new one. Said he's like to wait until the 16 th when he comes back in.

## 2018-11-06 ENCOUNTER — TELEPHONE (OUTPATIENT)
Dept: INFECTIOUS DISEASES | Facility: CLINIC | Age: 63
End: 2018-11-06

## 2018-11-06 DIAGNOSIS — M86.672 OTHER CHRONIC OSTEOMYELITIS OF LEFT FOOT: Primary | ICD-10-CM

## 2018-11-06 DIAGNOSIS — I73.9 PERIPHERAL ARTERIAL DISEASE: ICD-10-CM

## 2018-11-06 DIAGNOSIS — E11.9 DM TYPE 2, NOT AT GOAL: ICD-10-CM

## 2018-11-06 DIAGNOSIS — E11.610 DIABETIC CHARCOT FOOT: ICD-10-CM

## 2018-11-06 DIAGNOSIS — E11.610 TYPE 2 DIABETES MELLITUS WITH DIABETIC NEUROPATHIC ARTHROPATHY, WITH LONG-TERM CURRENT USE OF INSULIN: ICD-10-CM

## 2018-11-06 DIAGNOSIS — I10 ESSENTIAL HYPERTENSION: ICD-10-CM

## 2018-11-06 DIAGNOSIS — E78.5 HYPERLIPIDEMIA, UNSPECIFIED HYPERLIPIDEMIA TYPE: ICD-10-CM

## 2018-11-06 DIAGNOSIS — Z79.4 TYPE 2 DIABETES MELLITUS WITH DIABETIC NEUROPATHIC ARTHROPATHY, WITH LONG-TERM CURRENT USE OF INSULIN: ICD-10-CM

## 2018-11-06 DIAGNOSIS — E55.9 HYPOVITAMINOSIS D: ICD-10-CM

## 2018-11-06 DIAGNOSIS — I12.9 HYPERTENSIVE NEPHROPATHY: Chronic | ICD-10-CM

## 2018-11-06 DIAGNOSIS — E11.319 DIABETIC RETINOPATHY ASSOCIATED WITH TYPE 2 DIABETES MELLITUS, MACULAR EDEMA PRESENCE UNSPECIFIED, UNSPECIFIED LATERALITY, UNSPECIFIED RETINOPATHY SEVERITY: ICD-10-CM

## 2018-11-06 NOTE — TELEPHONE ENCOUNTER
----- Message from Cheyenne Mar MD sent at 11/5/2018  4:58 PM CST -----  We can repeat BMP and CRP this week  ----- Message -----  From: Melly Doss MA  Sent: 11/5/2018  10:57 AM  To: Cheyenne Mar MD        ----- Message -----  From: Camila Poe  Sent: 11/5/2018   9:59 AM  To: Melly Doss MA    Does he need lab done?

## 2018-11-15 ENCOUNTER — HISTORICAL (OUTPATIENT)
Dept: ADMINISTRATIVE | Facility: HOSPITAL | Age: 63
End: 2018-11-15

## 2018-11-15 LAB
ALBUMIN SERPL-MCNC: 2.5 G/DL (ref 3.1–4.7)
ALP SERPL-CCNC: 281 IU/L (ref 40–104)
ALT (SGPT): 18 IU/L (ref 3–33)
APTT PPP: 27.9 SEC (ref 21.7–37.8)
AST SERPL-CCNC: 20 IU/L (ref 10–40)
BASOPHILS NFR BLD: 0.1 K/UL (ref 0–0.2)
BASOPHILS NFR BLD: 0.9 %
BILIRUB SERPL-MCNC: 1 MG/DL (ref 0.3–1)
BNP SERPL-MCNC: 2365 PG/ML (ref 0–100)
BUN SERPL-MCNC: 46 MG/DL (ref 8–20)
BUN SERPL-MCNC: 48 MG/DL (ref 8–20)
CALCIUM SERPL-MCNC: 8.7 MG/DL (ref 7.7–10.4)
CALCIUM SERPL-MCNC: 8.7 MG/DL (ref 7.7–10.4)
CHLORIDE: 100 MMOL/L (ref 98–110)
CHLORIDE: 100 MMOL/L (ref 98–110)
CO2 SERPL-SCNC: 25 MMOL/L (ref 22.8–31.6)
CO2 SERPL-SCNC: 26 MMOL/L (ref 22.8–31.6)
CREATININE: 2.36 MG/DL (ref 0.6–1.4)
CREATININE: 2.4 MG/DL (ref 0.6–1.4)
CRP SERPL-MCNC: 17.94 MG/DL (ref 0–1.4)
EOSINOPHIL NFR BLD: 0.4 K/UL (ref 0–0.7)
EOSINOPHIL NFR BLD: 3 %
ERYTHROCYTE [DISTWIDTH] IN BLOOD BY AUTOMATED COUNT: 18 % (ref 11.7–14.9)
GLUCOSE: 89 MG/DL (ref 70–99)
GLUCOSE: 96 MG/DL (ref 70–99)
GRAN #: 11.1 K/UL (ref 1.4–6.5)
GRAN%: 81.2 %
HCT VFR BLD AUTO: 44.7 % (ref 39–55)
HGB BLD-MCNC: 13.7 G/DL (ref 14–16)
IMMATURE GRANS (ABS): 0.1 K/UL (ref 0–1)
IMMATURE GRANULOCYTES: 0.6 %
INR PPP: 1.3
LYMPH #: 0.9 K/UL (ref 1.2–3.4)
LYMPH%: 6.3 %
MCH RBC QN AUTO: 26.6 PG (ref 25–35)
MCHC RBC AUTO-ENTMCNC: 30.6 G/DL (ref 31–36)
MCV RBC AUTO: 86.8 FL (ref 80–100)
MONO #: 1.1 K/UL (ref 0.1–0.6)
MONO%: 8 %
NUCLEATED RBCS: 0 %
PLATELET # BLD AUTO: 363 K/UL (ref 140–440)
PMV BLD AUTO: 9.8 FL (ref 8.8–12.7)
POTASSIUM SERPL-SCNC: 4.2 MMOL/L (ref 3.5–5)
POTASSIUM SERPL-SCNC: 4.4 MMOL/L (ref 3.5–5)
PROT SERPL-MCNC: 6.6 G/DL (ref 6–8.2)
PROTHROMBIN TIME: 15.7 SEC (ref 11.3–15.2)
RBC # BLD AUTO: 5.15 M/UL (ref 4.3–5.9)
SODIUM: 135 MMOL/L (ref 134–144)
SODIUM: 136 MMOL/L (ref 134–144)
WBC # BLD AUTO: 13.7 K/UL (ref 5–10)

## 2018-11-16 LAB — HBA1C MFR BLD: 7.8 % (ref 3.1–6.5)

## 2018-11-27 RX ORDER — CARVEDILOL 12.5 MG/1
TABLET ORAL
COMMUNITY
End: 2018-12-20 | Stop reason: DRUGHIGH

## 2018-11-27 RX ORDER — AMIODARONE HYDROCHLORIDE 100 MG/1
TABLET ORAL
COMMUNITY
End: 2019-01-11 | Stop reason: DRUGHIGH

## 2018-11-27 RX ORDER — PIPERACILLIN SODIUM, TAZOBACTAM SODIUM 4; .5 G/20ML; G/20ML
INJECTION, POWDER, LYOPHILIZED, FOR SOLUTION INTRAVENOUS
COMMUNITY
Start: 2018-10-18 | End: 2018-12-03 | Stop reason: ALTCHOICE

## 2018-11-27 RX ORDER — INSULIN GLARGINE 100 [IU]/ML
INJECTION, SOLUTION SUBCUTANEOUS
COMMUNITY

## 2018-11-27 RX ORDER — CLOPIDOGREL BISULFATE 75 MG/1
TABLET ORAL
Refills: 0 | COMMUNITY
Start: 2018-09-13

## 2018-11-27 RX ORDER — MAGNESIUM 200 MG
TABLET ORAL
COMMUNITY
End: 2018-12-20 | Stop reason: SDUPTHER

## 2018-11-27 RX ORDER — CYANOCOBALAMIN (VITAMIN B-12) 500 MCG
TABLET ORAL
COMMUNITY
End: 2018-12-20 | Stop reason: DRUGHIGH

## 2018-11-27 RX ORDER — CEFEPIME HYDROCHLORIDE 2 G/1
INJECTION, POWDER, FOR SOLUTION INTRAVENOUS
COMMUNITY
Start: 2018-10-26 | End: 2018-12-03 | Stop reason: ALTCHOICE

## 2018-11-27 RX ORDER — AMPICILLIN AND SULBACTAM 2; 1 G/1; G/1
INJECTION, POWDER, FOR SOLUTION INTRAMUSCULAR; INTRAVENOUS
COMMUNITY
Start: 2018-10-08 | End: 2018-12-03 | Stop reason: ALTCHOICE

## 2018-11-27 RX ORDER — HYDRALAZINE HYDROCHLORIDE 10 MG/1
TABLET, FILM COATED ORAL
COMMUNITY

## 2018-12-03 ENCOUNTER — OFFICE VISIT (OUTPATIENT)
Dept: INFECTIOUS DISEASES | Facility: CLINIC | Age: 63
End: 2018-12-03
Payer: MEDICARE

## 2018-12-03 VITALS
WEIGHT: 257 LBS | SYSTOLIC BLOOD PRESSURE: 130 MMHG | DIASTOLIC BLOOD PRESSURE: 80 MMHG | OXYGEN SATURATION: 97 % | BODY MASS INDEX: 38.06 KG/M2 | TEMPERATURE: 98 F | HEART RATE: 97 BPM | HEIGHT: 69 IN

## 2018-12-03 DIAGNOSIS — E86.9 VOLUME DEPLETION: ICD-10-CM

## 2018-12-03 DIAGNOSIS — R11.0 NAUSEA: ICD-10-CM

## 2018-12-03 DIAGNOSIS — N28.9 RENAL INSUFFICIENCY: ICD-10-CM

## 2018-12-03 DIAGNOSIS — Z79.2 ENCOUNTER FOR LONG-TERM (CURRENT) USE OF ANTIBIOTICS: ICD-10-CM

## 2018-12-03 DIAGNOSIS — Z79.4 TYPE 2 DIABETES MELLITUS WITH DIABETIC NEUROPATHIC ARTHROPATHY, WITH LONG-TERM CURRENT USE OF INSULIN: ICD-10-CM

## 2018-12-03 DIAGNOSIS — A41.01 MSSA (METHICILLIN SUSCEPTIBLE STAPHYLOCOCCUS AUREUS) SEPTICEMIA: Primary | ICD-10-CM

## 2018-12-03 DIAGNOSIS — M86.272 SUBACUTE OSTEOMYELITIS OF LEFT FOOT: ICD-10-CM

## 2018-12-03 DIAGNOSIS — I73.9 PERIPHERAL ARTERIAL DISEASE: ICD-10-CM

## 2018-12-03 DIAGNOSIS — E11.610 TYPE 2 DIABETES MELLITUS WITH DIABETIC NEUROPATHIC ARTHROPATHY, WITH LONG-TERM CURRENT USE OF INSULIN: ICD-10-CM

## 2018-12-03 PROCEDURE — 1101F PT FALLS ASSESS-DOCD LE1/YR: CPT | Mod: ,,, | Performed by: INTERNAL MEDICINE

## 2018-12-03 PROCEDURE — 3008F BODY MASS INDEX DOCD: CPT | Mod: ,,, | Performed by: INTERNAL MEDICINE

## 2018-12-03 PROCEDURE — 99214 OFFICE O/P EST MOD 30 MIN: CPT | Mod: ,,, | Performed by: INTERNAL MEDICINE

## 2018-12-03 PROCEDURE — 3045F PR MOST RECENT HEMOGLOBIN A1C LEVEL 7.0-9.0%: CPT | Mod: ,,, | Performed by: INTERNAL MEDICINE

## 2018-12-03 PROCEDURE — 3075F SYST BP GE 130 - 139MM HG: CPT | Mod: ,,, | Performed by: INTERNAL MEDICINE

## 2018-12-03 PROCEDURE — 3079F DIAST BP 80-89 MM HG: CPT | Mod: ,,, | Performed by: INTERNAL MEDICINE

## 2018-12-03 RX ORDER — PROMETHAZINE HYDROCHLORIDE 12.5 MG/1
12.5 TABLET ORAL EVERY 4 HOURS PRN
Qty: 60 TABLET | Refills: 1 | Status: SHIPPED | OUTPATIENT
Start: 2018-12-03

## 2018-12-03 RX ORDER — CIPROFLOXACIN 500 MG/1
500 TABLET ORAL DAILY
COMMUNITY
Start: 2018-11-16

## 2018-12-03 RX ORDER — METOCLOPRAMIDE 10 MG/1
5 TABLET ORAL
Qty: 90 TABLET | Refills: 1 | Status: SHIPPED | OUTPATIENT
Start: 2018-12-03 | End: 2019-03-03

## 2018-12-03 RX ORDER — CEFAZOLIN SODIUM 2 G/50ML
2000 SOLUTION INTRAVENOUS EVERY 8 HOURS
COMMUNITY

## 2018-12-03 NOTE — PROGRESS NOTES
Subjective:       Patient ID: Hector Luis is a 63 y.o. male.    Chief Complaint:: Follow-up    HPI   After he chose to take a break from the IV antibiotics within about 7-10 days he had fever and active cellulitis requiring admission to [facility]. He had staph aureus in the blood as well as from the foot and he is now S/p ray resection 11/6 and second debridement and closure on 11/27. He also required left femoral-popliteal bypass by Dr. Jeff. 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 He was discharged on Ancef 2 g IV every 8 and oral Cipro to cover the possibility of a prior Pseudomonas being persistent in the wound. Of the last 3 days he's had nausea and vomiting similar to the last 2 episodes of home IV antibiotics. Previously I thought he may have diabetic gastroparesis. He did not have any difficulty with nausea while receiving these exact same antibiotics in the hospital. It is also possible that there is an anxiety component.    Review of patient's allergies indicates:   Allergen Reactions    Codeine      Past Medical History:   Diagnosis Date    Coronary artery disease     Diabetes mellitus     Diabetes mellitus type II     Hyperlipidemia     Hypertension     MSSA (methicillin susceptible Staphylococcus aureus) septicemia 11/2018    from osteomyelitis of foot    Obesity     Osteomyelitis of left foot 11/2018    left first metatarsal/toe    Peripheral arterial disease     Type 2 diabetes mellitus with diabetic arthropathy, with long-term current use of insulin 4/24/2017    Type 2 diabetes mellitus with stage 3 chronic kidney disease, with long-term current use of insulin      Past Surgical History:   Procedure Laterality Date    BALLOON ANGIOPLASTY, ARTERY Left 09/12/2018    Left lower extremity    FOOT AMPUTATION THROUGH METATARSAL Left 11/2018    Required first ray  "amputation followed by debridement for necrosis    FRACTURE SURGERY      Groshong  2018    HAND SURGERY      Left lower extremity bypass  2018     Social History     Tobacco Use    Smoking status: Former Smoker     Types: Cigarettes     Last attempt to quit: 1977     Years since quittin.3    Smokeless tobacco: Never Used   Substance Use Topics    Alcohol use: Yes     Comment: occasionally     Social History     Occupational History    Not on file     Family History   Problem Relation Age of Onset    Cancer Mother     Diabetes Mother     Heart disease Father     Stroke Father     Hypertension Father     Diabetes Maternal Grandmother          Review of Systems    Constitutional: No fever, chills, sweats, but has fatigue, weakness, weight loss from being unable to eat    Eyes: No change in vision, loss of vision or diplopia, photophobia    ENT: No sore throat, mouth pains, or lesions    Cardiovascular: No chest pain, SANDOVAL, or change in his usual pedal edema    Respiratory: No shortness of breath, SANDOVAL, cough, wheeze, sputum    Gastrointestinal: No abdominal pain, but has nausea, vomiting, no diarrhea, constipation, blood in stool, or focal abdominal pain    Genitourinary: No dysuria, prostatism    Musculoskeletal: No new pain, joint swelling, or injuries and he actually does not need any medication for pain    Integumentary: No new rashes, skin lesions,    Neurological: No dizziness, vertigo, unusual headaches,     Psychiatric: Denies anxiety, depression but I believe to be true    Endocrine: Blood sugars are not well controlled at this time because of inconsistent eating and medication    Lymphatic: No lymphadenopathy, blood loss, anemia, malignancy    VAD: No problems with Groshong placement    Objective:      Blood pressure 130/80, pulse 97, temperature 97.8 °F (36.6 °C), temperature source Temporal, height 5' 9" (1.753 m), weight 116.6 kg (257 lb), SpO2 97 %. Body mass index is 37.95 " kg/m².  Physical Exam      General: Alert and attentive, cooperative and in no distress but looks tired and anxious    Eyes: Pupils equal, round, reactive to light, anicteric, EOMI    Neck: Supple, non-tender, no thyromegaly or masses    ENT: EAC patent, nares patent, no oral lesions, teeth in good condition, no thrush    Cardiovascular: Regular rate and rhythm, no murmurs, rubs, or gallop    Respiratory: Lungs clear without wheezes, rales, rubs or rhonci    Gastrointestinal:  Soft, bowel sounds normal,  no mass or organomegaly, no tenderness or distention    Genitourinary: no flank tenderness    Integumentary: Skin without rashes, lesions,      Rectal/Anal:     Vascular: Tremendous as usual, right-sided peripheral edema but the left leg is less edematous,  warm and well perfused    Musculoskeletal: In a wheelchair as he is not to be weightbearing no acute arthritis, synovitis or myositis.    Lymphatic:     Neurological: Normal LOC, cranial nerves, speech,    Psychiatric: Normal mood, speech,  demeanor     Wound: Left foot football bandage was removed. There is an adherent dressing over the incision which is still in place except for the distal 1 cm which is open and has a bit of serosanguineous discharge. There is no cellulitis. Overall it looks fantastic compared to what he had recently. Cleaned and redressed    VAD:  groshong looks fine without redness, tenderness, drainage       Recent Diagnostics: Reviewed available lab sent from [facility] today, BUN 60 creatinine 2.14          Assessment and Plan:           MSSA (methicillin susceptible Staphylococcus aureus) septicemia    Subacute osteomyelitis of left foot    Peripheral arterial disease    Encounter for long-term (current) use of antibiotics    Nausea    Renal insufficiency    Volume depletion    Type 2 diabetes mellitus with diabetic neuropathic arthropathy, with long-term current use of insulin    Other orders  -     metoclopramide HCl (REGLAN) 10 MG  tablet; Take 0.5 tablets (5 mg total) by mouth 3 (three) times daily before meals.  Dispense: 90 tablet; Refill: 1  -     promethazine (PHENERGAN) 12.5 MG Tab; Take 1 tablet (12.5 mg total) by mouth every 4 (four) hours as needed.  Dispense: 60 tablet; Refill: 1      Continue ancef and if you can tolerate it, cipro once a day  Continue probiotic    I have sent in reglan(metoclopramide) 5 mg 30 min before each meal as needed for nausea and phenergran(promethazine) 12.5 mg every 4-6 hours as needed for nausea. Discussed attentive but infrequent dystonic reaction with he and his wife    Return on ana m 3 as planned    Call me if you need me    This note was created using Dragon voice recognition software that occasionally misinterpreted phrases or words.

## 2018-12-03 NOTE — PATIENT INSTRUCTIONS
Continue ancef and if you can tolerate it, cipro once a day  Continue probiotic    I have sent in reglan(metoclopramide) 5 mg 30 min before each meal as needed for nausea and phenergran(promethazine) 12.5 mg every 4-6 hours as needed for nausea.     Return on ana m 3 as planned    Call me if you need me

## 2018-12-09 ENCOUNTER — PATIENT MESSAGE (OUTPATIENT)
Dept: INFECTIOUS DISEASES | Facility: CLINIC | Age: 63
End: 2018-12-09

## 2018-12-17 ENCOUNTER — TELEPHONE (OUTPATIENT)
Dept: TRANSPLANT | Facility: CLINIC | Age: 63
End: 2018-12-17

## 2018-12-17 NOTE — TELEPHONE ENCOUNTER
With multiple attempts to confirm need or not  for advanced options referral no response from referring provider. Referral closed.

## 2018-12-20 ENCOUNTER — OFFICE VISIT (OUTPATIENT)
Dept: PULMONOLOGY | Facility: CLINIC | Age: 63
End: 2018-12-20
Payer: MEDICARE

## 2018-12-20 VITALS
WEIGHT: 257 LBS | HEIGHT: 69 IN | OXYGEN SATURATION: 98 % | BODY MASS INDEX: 38.06 KG/M2 | HEART RATE: 85 BPM | DIASTOLIC BLOOD PRESSURE: 80 MMHG | SYSTOLIC BLOOD PRESSURE: 130 MMHG

## 2018-12-20 DIAGNOSIS — R09.82 POST-NASAL DRIP: ICD-10-CM

## 2018-12-20 DIAGNOSIS — N18.9 CHRONIC KIDNEY DISEASE, UNSPECIFIED CKD STAGE: ICD-10-CM

## 2018-12-20 DIAGNOSIS — K21.9 GASTROESOPHAGEAL REFLUX DISEASE, ESOPHAGITIS PRESENCE NOT SPECIFIED: ICD-10-CM

## 2018-12-20 DIAGNOSIS — R05.9 COUGH: ICD-10-CM

## 2018-12-20 DIAGNOSIS — I50.9 CONGESTIVE HEART FAILURE, UNSPECIFIED HF CHRONICITY, UNSPECIFIED HEART FAILURE TYPE: ICD-10-CM

## 2018-12-20 PROCEDURE — 3079F DIAST BP 80-89 MM HG: CPT | Mod: ,,, | Performed by: NURSE PRACTITIONER

## 2018-12-20 PROCEDURE — 3008F BODY MASS INDEX DOCD: CPT | Mod: ,,, | Performed by: NURSE PRACTITIONER

## 2018-12-20 PROCEDURE — 99214 OFFICE O/P EST MOD 30 MIN: CPT | Mod: ,,, | Performed by: NURSE PRACTITIONER

## 2018-12-20 PROCEDURE — 3075F SYST BP GE 130 - 139MM HG: CPT | Mod: ,,, | Performed by: NURSE PRACTITIONER

## 2018-12-20 PROCEDURE — 1101F PT FALLS ASSESS-DOCD LE1/YR: CPT | Mod: ,,, | Performed by: NURSE PRACTITIONER

## 2018-12-20 RX ORDER — BENZONATATE 200 MG/1
200 CAPSULE ORAL 3 TIMES DAILY
Qty: 90 CAPSULE | Refills: 3 | Status: SHIPPED | OUTPATIENT
Start: 2018-12-20 | End: 2018-12-30

## 2018-12-20 RX ORDER — PANTOPRAZOLE SODIUM 40 MG/1
40 TABLET, DELAYED RELEASE ORAL DAILY
Qty: 30 TABLET | Refills: 3 | Status: SHIPPED | OUTPATIENT
Start: 2018-12-20 | End: 2018-12-20 | Stop reason: SDUPTHER

## 2018-12-20 RX ORDER — PANTOPRAZOLE SODIUM 40 MG/1
TABLET, DELAYED RELEASE ORAL
Qty: 90 TABLET | Refills: 3 | Status: SHIPPED | OUTPATIENT
Start: 2018-12-20

## 2018-12-20 NOTE — PROGRESS NOTES
SUBJECTIVE:    Patient ID: Hector Luis is a 63 y.o. male.    Chief Complaint: Cough (ongoing cough dry )    HPI   Patient here for a chronic cough.  He was seen in 2014 but did not follow up.  His PFTs at that time were fine.  He states the cough has gotten worse over the last several months.  It is a dry cough.  He is being treated currently for Osteomyelitis of left foot and MSSA septicemia.  He is being followed by ID.  He recently had a left fem-pop per Dr. Jeff.  He is receiving wound care to his legs and IV antibiotics.  He has CKD, history of heart failure as well.    Past Medical History:   Diagnosis Date    Coronary artery disease     Diabetes mellitus     Diabetes mellitus type II     Hyperlipidemia     Hypertension     MSSA (methicillin susceptible Staphylococcus aureus) septicemia 11/2018    from osteomyelitis of foot    Obesity     Osteomyelitis of left foot 11/2018    left first metatarsal/toe    Peripheral arterial disease     Type 2 diabetes mellitus with diabetic arthropathy, with long-term current use of insulin 4/24/2017    Type 2 diabetes mellitus with stage 3 chronic kidney disease, with long-term current use of insulin      Past Surgical History:   Procedure Laterality Date    BALLOON ANGIOPLASTY, ARTERY Left 09/12/2018    Left lower extremity    FOOT AMPUTATION THROUGH METATARSAL Left 11/2018    Required first ray amputation followed by debridement for necrosis    FRACTURE SURGERY      Groshong  11/2018    HAND SURGERY      Left lower extremity bypass  11/2018     Family History   Problem Relation Age of Onset    Cancer Mother     Diabetes Mother     Heart disease Father     Stroke Father     Hypertension Father     Diabetes Maternal Grandmother         Social History:   Marital Status:   Occupation: Data Unavailable  Alcohol History:  reports that he drinks alcohol.  Tobacco History:  reports that he quit smoking about 41 years ago. His smoking use included  "cigarettes. he has never used smokeless tobacco.  Drug History:  reports that he does not use drugs.    Review of patient's allergies indicates:   Allergen Reactions    Codeine        Current Outpatient Medications   Medication Sig Dispense Refill    aspirin (ECOTRIN) 81 MG EC tablet Take 81 mg by mouth once daily.        blood sugar diagnostic (TRUETEST TEST STRIPS) Strp 1 strip by Misc.(Non-Drug; Combo Route) route 4 (four) times daily. 400 each 3    carvedilol (COREG) 3.125 MG tablet Pt is taking 1/2 tab BID  2    ceFAZolin 2 g/50mL Dextrose IVPB (ANCEF) 2 gram/50 mL PgBk Inject 2,000 mg into the vein every 8 (eight) hours.      folic acid (FOLVITE) 1 MG tablet Take 1 tablet (1,000 mcg total) by mouth once daily. 90 tablet 3    insulin aspart (NOVOLOG FLEXPEN) 100 unit/mL InPn pen INJECT SUBCUTANEOUSLY PER SLIDING SCALE 4 UNITS AT BREAKFAST, 10 UNITS AT LUNCH, AND 10 UNITS AT DINNER (Patient taking differently: INJECT SUBCUTANEOUSLY PER SLIDING SCALE) 15 mL 3    insulin glargine (LANTUS SOLOSTAR) 100 unit/mL (3 mL) InPn INJECT 65 UNITS UNDER THE SKIN EVERY MORNING AND 55 UNITS UNDER THE SKIN EVERY EVENING (Patient taking differently: INJECT 60 UNITS UNDER THE SKIN EVERY MORNING AND 50 UNITS UNDER THE SKIN EVERY EVENING) 45 mL 3    insulin glargine (LANTUS U-100 INSULIN) 100 unit/mL injection Lantus U-100 Insulin      lancets Misc Checks 4 times a day 200 each 11    magnesium 200 mg Tab Take 400 tablets by mouth once.      metoclopramide HCl (REGLAN) 10 MG tablet Take 0.5 tablets (5 mg total) by mouth 3 (three) times daily before meals. 90 tablet 1    omeprazole (PRILOSEC) 40 MG capsule TAKE 1 CAPSULE BY MOUTH EVERY DAY 90 capsule 10    ondansetron (ZOFRAN) 4 MG tablet Take 1 tablet (4 mg total) by mouth every 6 (six) hours as needed for Nausea. 40 tablet 1    pen needle, diabetic (NOVOFINE 30) 30 gauge x 1/3" Ndle USE FOUR TIMES DAILY 200 each 12    promethazine (PHENERGAN) 12.5 MG Tab Take 1 " "tablet (12.5 mg total) by mouth every 4 (four) hours as needed. 60 tablet 1    SANTYL ointment       torsemide (DEMADEX) 20 MG Tab Take 20 mg by mouth once daily.      amiodarone (PACERONE) 100 MG Tab amiodarone      benzonatate (TESSALON) 200 MG capsule Take 1 capsule (200 mg total) by mouth 3 (three) times daily. for 10 days 90 capsule 3    blood-glucose meter (TRUE METRIX AIR GLUCOSE METER) kit Use as instructed 1 each 0    ciprofloxacin HCl (CIPRO) 500 MG tablet Take 500 mg by mouth once daily.      clopidogrel (PLAVIX) 75 mg tablet TK 1 T PO QD  0    furosemide (LASIX) 20 MG tablet       gabapentin 10 % cmap gabapentin      hydrALAZINE (APRESOLINE) 10 MG tablet hydralazine      hydrALAZINE (APRESOLINE) 25 MG tablet       NITROSTAT 0.4 mg SL tablet   0    pantoprazole (PROTONIX) 40 MG tablet Take 1 tablet (40 mg total) by mouth once daily. 30 tablet 3     No current facility-administered medications for this visit.        Alpha-1 Antitrypsin:  Last PFT: 2014  Last CT:    Review of Systems  General: tired   Eyes: Vision is good.  ENT:  No sinusitis or pharyngitis.   Heart:: No chest pain or palpitations.  Lungs: dry cough all day and night   GI: nausea at times   : No dysuria, hesitancy, or nocturia.  Musculoskeletal: No joint pain or myalgias.  Skin: No lesions or rashes.  Neuro: No headaches or neuropathy.  Lymph: swelling to legs   Psych: some anxiety or depression.  Endo: No weight change.    OBJECTIVE:      /80 (BP Location: Left arm, Patient Position: Sitting)   Pulse 85   Ht 5' 9" (1.753 m)   Wt 116.6 kg (257 lb)   SpO2 98%   BMI 37.95 kg/m²     Physical Exam  GENERAL: Older patient in no distress.  HEENT: Pupils equal and reactive. Extraocular movements intact. Nose intact.      Pharynx moist. Thick post nasal drip visualized   NECK: Supple.   HEART: Regular rate and rhythm. No murmur or gallop auscultated.  LUNGS: crackles to bases posteriorly and laterally   ABDOMEN: obese, " Bowel sounds present. Non-tender, no masses palpated.  EXTREMITIES: left leg dressed, left foot with partial amputation and great toe  LYMPHATICS:2+ pitting edema to legs  SKIN: legs wrapped, unable to assess    NEURO: Cranial nerves II-XII intact. Motor strength 5/5 bilaterally, upper and lower extremities.  PSYCH: Appropriate affect.    Assessment:       1. Cough    2. Chronic kidney disease, unspecified CKD stage    3. Post-nasal drip    4. Congestive heart failure, unspecified HF chronicity, unspecified heart failure type    5. Gastroesophageal reflux disease, esophagitis presence not specified          Plan:       Cough    Chronic kidney disease, unspecified CKD stage    Post-nasal drip    Congestive heart failure, unspecified HF chronicity, unspecified heart failure type    Gastroesophageal reflux disease, esophagitis presence not specified    Other orders  -     pantoprazole (PROTONIX) 40 MG tablet; Take 1 tablet (40 mg total) by mouth once daily.  Dispense: 30 tablet; Refill: 3  -     benzonatate (TESSALON) 200 MG capsule; Take 1 capsule (200 mg total) by mouth 3 (three) times daily. for 10 days  Dispense: 90 capsule; Refill: 3       Tessalon 200mg by mouth three times a day   Allegra daily   flonase 2 puffs to each nostril daily   Add a BNP to next lab draw that home health does  PFTs  protonix 40mg daily  Follow-up in about 3 weeks (around 1/10/2019).

## 2018-12-20 NOTE — PATIENT INSTRUCTIONS

## 2018-12-27 ENCOUNTER — HISTORICAL (OUTPATIENT)
Dept: ADMINISTRATIVE | Facility: HOSPITAL | Age: 63
End: 2018-12-27

## 2018-12-29 ENCOUNTER — OUTSIDE PLACE OF SERVICE (OUTPATIENT)
Dept: PULMONOLOGY | Facility: CLINIC | Age: 63
End: 2018-12-29
Payer: MEDICARE

## 2018-12-29 PROCEDURE — 99233 SBSQ HOSP IP/OBS HIGH 50: CPT | Mod: S$GLB,,, | Performed by: INTERNAL MEDICINE

## 2018-12-29 PROCEDURE — 99233 PR SUBSEQUENT HOSPITAL CARE,LEVL III: ICD-10-PCS | Mod: S$GLB,,, | Performed by: INTERNAL MEDICINE

## 2018-12-30 ENCOUNTER — OUTSIDE PLACE OF SERVICE (OUTPATIENT)
Dept: PULMONOLOGY | Facility: CLINIC | Age: 63
End: 2018-12-30
Payer: MEDICARE

## 2018-12-30 PROCEDURE — 99232 PR SUBSEQUENT HOSPITAL CARE,LEVL II: ICD-10-PCS | Mod: ,,, | Performed by: INTERNAL MEDICINE

## 2018-12-30 PROCEDURE — 99232 SBSQ HOSP IP/OBS MODERATE 35: CPT | Mod: ,,, | Performed by: INTERNAL MEDICINE

## 2019-01-04 ENCOUNTER — TELEPHONE (OUTPATIENT)
Dept: INFECTIOUS DISEASES | Facility: CLINIC | Age: 64
End: 2019-01-04

## 2019-01-04 NOTE — TELEPHONE ENCOUNTER
"Discussed with Hector. He has questions about his foot. He is on a "light" antibiotic now (he is on hospice). He does not need to take levaquin.     He is getting dark sores on upper legs and arms, and she was worried about Jacob's rosalie syndrome. Hospice visited 2 days ago. It does not sound like SJS to me.     Spoke with wife and Hector.   "

## 2019-01-04 NOTE — TELEPHONE ENCOUNTER
----- Message from Brinda Ro sent at 1/4/2019  1:19 PM CST -----  Contact: Self  The patient had a few questions for you and asked if you could call him at [phone]

## 2019-01-17 ENCOUNTER — OFFICE VISIT (OUTPATIENT)
Dept: PODIATRY | Facility: CLINIC | Age: 64
End: 2019-01-17
Payer: MEDICARE

## 2019-01-17 VITALS
WEIGHT: 257 LBS | HEIGHT: 69 IN | SYSTOLIC BLOOD PRESSURE: 126 MMHG | TEMPERATURE: 98 F | HEART RATE: 94 BPM | DIASTOLIC BLOOD PRESSURE: 85 MMHG | BODY MASS INDEX: 38.06 KG/M2

## 2019-01-17 DIAGNOSIS — L97.522 SKIN ULCER OF LEFT FOOT WITH FAT LAYER EXPOSED: Primary | ICD-10-CM

## 2019-01-17 DIAGNOSIS — E11.41 DIABETIC MONONEUROPATHY ASSOCIATED WITH TYPE 2 DIABETES MELLITUS: ICD-10-CM

## 2019-01-17 PROCEDURE — 99213 PR OFFICE/OUTPT VISIT, EST, LEVL III, 20-29 MIN: ICD-10-PCS | Mod: GV,,, | Performed by: PODIATRIST

## 2019-01-17 PROCEDURE — 99213 OFFICE O/P EST LOW 20 MIN: CPT | Mod: GV,,, | Performed by: PODIATRIST

## 2019-01-17 NOTE — PROGRESS NOTES
[address] [facility] [address]  [place] [address]  Phone: [phone]   [phone]    Patient's PCP:Harley Feliz MD  Referring Provider: No ref. provider found    Subjective:      Chief Complaint:: Follow-up (ulcer left foot)    CRISTAL Luis is a 63 y.o. male who presents with a complaint of f/u ulcer left foot lasting for 8 months. Onset of the symptoms was pressure  Current symptoms include draining. Symptoms have stayed the same.Treatment to date have included surgical shoe,bandage Patients rates pain 7/10 on pain scale.    Vitals:    01/17/19 1102   BP: 126/85   Pulse: 94   Temp: 97.9 °F (36.6 °C)     Shoe Size: 9    Past Surgical History:   Procedure Laterality Date    BALLOON ANGIOPLASTY, ARTERY Left 09/12/2018    Left lower extremity    FOOT AMPUTATION THROUGH METATARSAL Left 11/2018    Required first ray amputation followed by debridement for necrosis    FRACTURE SURGERY      [place]ong  11/2018    HAND SURGERY      Left lower extremity bypass  11/2018    WOUND DEBRIDEMENT Left 11/27/2018    excisional debridement of necrotic tissue left foot     Past Medical History:   Diagnosis Date    Coronary artery disease     Diabetes mellitus     Diabetes mellitus type II     Hyperlipidemia     Hypertension     MSSA (methicillin susceptible Staphylococcus aureus) septicemia 11/2018    from osteomyelitis of foot    Obesity     Osteomyelitis of left foot 11/2018    left first metatarsal/toe    Peripheral arterial disease     Type 2 diabetes mellitus with diabetic arthropathy, with long-term current use of insulin 4/24/2017    Type 2 diabetes mellitus with stage 3 chronic kidney disease, with long-term current use of insulin      Family History   Problem Relation Age of Onset    Cancer Mother     Diabetes Mother     Heart disease Father     Stroke Father     Hypertension Father     Diabetes Maternal Grandmother         Social History:   Marital Status:   Alcohol History:   "reports that he drinks alcohol.  Tobacco History:  reports that he quit smoking about 41 years ago. His smoking use included cigarettes. he has never used smokeless tobacco.  Drug History:  reports that he does not use drugs.    Review of patient's allergies indicates:   Allergen Reactions    Codeine        Current Outpatient Medications   Medication Sig Dispense Refill    amiodarone (PACERONE) 200 MG Tab Take 1 tablet by mouth every other day.  0    blood sugar diagnostic (TRUETEST TEST STRIPS) Strp 1 strip by Misc.(Non-Drug; Combo Route) route 4 (four) times daily. 400 each 3    cyclobenzaprine (FLEXERIL) 5 MG tablet Take 1 tablet by mouth every other day.  0    furosemide (LASIX) 40 MG tablet Take 1 tablet by mouth once daily.  2    HYDROcodone-acetaminophen (NORCO) 7.5-325 mg per tablet Take 1 tablet by mouth every 4 (four) hours as needed.  0    insulin aspart (NOVOLOG FLEXPEN) 100 unit/mL InPn pen INJECT SUBCUTANEOUSLY PER SLIDING SCALE 4 UNITS AT BREAKFAST, 10 UNITS AT LUNCH, AND 10 UNITS AT DINNER (Patient taking differently: INJECT SUBCUTANEOUSLY PER SLIDING SCALE) 15 mL 3    insulin glargine (LANTUS SOLOSTAR) 100 unit/mL (3 mL) InPn INJECT 65 UNITS UNDER THE SKIN EVERY MORNING AND 55 UNITS UNDER THE SKIN EVERY EVENING (Patient taking differently: INJECT 60 UNITS UNDER THE SKIN EVERY MORNING AND 50 UNITS UNDER THE SKIN EVERY EVENING) 45 mL 3    lancets Misc Checks 4 times a day 200 each 11    metOLazone (ZAROXOLYN) 5 MG tablet Take 1 tablet by mouth once daily.  1    morphine 100 mg/5 mL (20 mg/mL) concentrated solution GIVE 0.5 TO 1 ML PO UNDER TONGUE Q 2 H PRN  0    NYSTOP powder AS DIRECTED  0    ondansetron (ZOFRAN) 4 MG tablet Take 1 tablet (4 mg total) by mouth every 6 (six) hours as needed for Nausea. 40 tablet 1    pen needle, diabetic (NOVOFINE 30) 30 gauge x 1/3" Ndle USE FOUR TIMES DAILY 200 each 12    promethazine (PHENERGAN) 12.5 MG Tab Take 1 tablet (12.5 mg total) by mouth " every 4 (four) hours as needed. 60 tablet 1    SANTYL ointment       albuterol-ipratropium (DUO-NEB) 2.5 mg-0.5 mg/3 mL nebulizer solution USE 1 VIAL VIA NEBULIZER Q 4 H PRN CONGESTION  0    aspirin (ECOTRIN) 81 MG EC tablet Take 81 mg by mouth once daily.        carvedilol (COREG) 3.125 MG tablet Pt is taking 1/2 tab BID  2    ceFAZolin 2 g/50mL Dextrose IVPB (ANCEF) 2 gram/50 mL PgBk Inject 2,000 mg into the vein every 8 (eight) hours.      ciprofloxacin HCl (CIPRO) 500 MG tablet Take 500 mg by mouth once daily.      clopidogrel (PLAVIX) 75 mg tablet TK 1 T PO QD  0    folic acid (FOLVITE) 1 MG tablet Take 1 tablet (1,000 mcg total) by mouth once daily. 90 tablet 3    gabapentin 10 % cmap gabapentin      hydrALAZINE (APRESOLINE) 10 MG tablet hydralazine      hydrALAZINE (APRESOLINE) 25 MG tablet       insulin glargine (LANTUS U-100 INSULIN) 100 unit/mL injection Lantus U-100 Insulin      magnesium 200 mg Tab Take 400 tablets by mouth once.      metoclopramide HCl (REGLAN) 10 MG tablet Take 0.5 tablets (5 mg total) by mouth 3 (three) times daily before meals. 90 tablet 1    NITROSTAT 0.4 mg SL tablet   0    omeprazole (PRILOSEC) 40 MG capsule TAKE 1 CAPSULE BY MOUTH EVERY DAY 90 capsule 10    pantoprazole (PROTONIX) 40 MG tablet TAKE 1 TABLET(40 MG) BY MOUTH EVERY DAY 90 tablet 3    torsemide (DEMADEX) 20 MG Tab Take 20 mg by mouth once daily.       No current facility-administered medications for this visit.        Review of Systems   Constitutional: Positive for chills, fatigue and fever. Negative for unexpected weight change.   HENT: Positive for hearing loss. Negative for trouble swallowing.    Eyes: Negative for photophobia and visual disturbance.   Respiratory: Positive for cough, shortness of breath and wheezing.    Cardiovascular: Positive for chest pain and leg swelling. Negative for palpitations.   Gastrointestinal: Positive for nausea. Negative for abdominal pain.   Genitourinary:  Positive for frequency. Negative for dysuria.   Musculoskeletal: Negative for back pain and joint swelling.   Skin: Positive for wound. Negative for rash.   Neurological: Positive for seizures. Negative for tremors, weakness, numbness and headaches.   Hematological: Bruises/bleeds easily.         Objective:        Physical Exam:               Vascular Exam       Left Pulses  Dorsalis Pedis:      Detected w/ doppler  Posterior Tibial:      Detected w/ doppler          Physical Exam   Cardiovascular:   Pulses:       Dorsalis pedis pulses are Detected w/ doppler on the left side.        Posterior tibial pulses are Detected w/ doppler on the left side.   Musculoskeletal:        Legs:       Feet:    Feet:   Left Foot:   Protective Sensation: 4 sites tested. 0 sites sensed.   Skin Integrity: Positive for ulcer (The left foot has a grade 2 ulceration down to fat that is 8 cm x 3 cm x 0.5 cm deep. There is a perimeter of erythema and edema no purulent drainage noted no fluctuance.).       Imaging:          Assessment and Plan:           Skin ulcer of left foot with fat layer exposed    Diabetic mononeuropathy associated with type 2 diabetes mellitus    Plan  Change dressing on the left foot twice a day apply Santyl. Continue wrapping both lower legs in treating the venous stases blisters. Patient is under hospice care and they will continue to see him and do wound care when they see him.  Return to see me in 2 months or as needed.    No Follow-up on file.    Procedures - None    Counseling:   I provided patient education verbally regarding:   Patient diagnosis, treatment options, as well as alternatives, risks, and benefits.   proper ulcer care and the possible need for serial debridements, topical medications, specific dressings and biological engineered skin substitutes if indicated.  This note was created using Dragon voice recognition software that occasionally misinterpreted phrases or words.

## 2019-01-28 ENCOUNTER — PATIENT MESSAGE (OUTPATIENT)
Dept: PULMONOLOGY | Facility: CLINIC | Age: 64
End: 2019-01-28

## 2019-01-28 ENCOUNTER — DOCUMENTATION ONLY (OUTPATIENT)
Dept: PULMONOLOGY | Facility: CLINIC | Age: 64
End: 2019-01-28